# Patient Record
Sex: MALE | Race: WHITE | NOT HISPANIC OR LATINO | Employment: OTHER | URBAN - NONMETROPOLITAN AREA
[De-identification: names, ages, dates, MRNs, and addresses within clinical notes are randomized per-mention and may not be internally consistent; named-entity substitution may affect disease eponyms.]

---

## 2023-06-15 RX ORDER — ATORVASTATIN CALCIUM 40 MG/1
1 TABLET, FILM COATED ORAL DAILY
COMMUNITY
Start: 2017-12-14 | End: 2024-01-18 | Stop reason: SDUPTHER

## 2023-06-15 RX ORDER — OMEGA-3 FATTY ACIDS/FISH OIL 340-1000MG
1 CAPSULE ORAL DAILY
COMMUNITY

## 2023-06-15 RX ORDER — MULTIVIT WITH MINERALS/HERBS
1 TABLET ORAL DAILY
COMMUNITY

## 2023-06-15 RX ORDER — ASPIRIN 81 MG/1
1 TABLET ORAL DAILY
COMMUNITY

## 2023-06-15 RX ORDER — LISINOPRIL 30 MG/1
1 TABLET ORAL DAILY
COMMUNITY
End: 2023-06-16 | Stop reason: SDUPTHER

## 2023-06-15 RX ORDER — AMLODIPINE BESYLATE 5 MG/1
1 TABLET ORAL DAILY
COMMUNITY
Start: 2020-01-16 | End: 2024-01-18 | Stop reason: SDUPTHER

## 2023-06-16 ENCOUNTER — OFFICE VISIT (OUTPATIENT)
Dept: PRIMARY CARE | Facility: CLINIC | Age: 73
End: 2023-06-16
Payer: MEDICARE

## 2023-06-16 VITALS
HEART RATE: 72 BPM | SYSTOLIC BLOOD PRESSURE: 124 MMHG | WEIGHT: 186 LBS | HEIGHT: 70 IN | BODY MASS INDEX: 26.63 KG/M2 | DIASTOLIC BLOOD PRESSURE: 80 MMHG

## 2023-06-16 DIAGNOSIS — E78.2 MIXED HYPERLIPIDEMIA: ICD-10-CM

## 2023-06-16 DIAGNOSIS — I10 HYPERTENSION, UNSPECIFIED TYPE: ICD-10-CM

## 2023-06-16 DIAGNOSIS — I73.9 PAD (PERIPHERAL ARTERY DISEASE) (CMS-HCC): ICD-10-CM

## 2023-06-16 DIAGNOSIS — Z98.1 HISTORY OF LUMBAR FUSION: ICD-10-CM

## 2023-06-16 DIAGNOSIS — Z00.00 ROUTINE GENERAL MEDICAL EXAMINATION AT HEALTH CARE FACILITY: Primary | ICD-10-CM

## 2023-06-16 DIAGNOSIS — Z12.5 SCREENING FOR PROSTATE CANCER: ICD-10-CM

## 2023-06-16 PROCEDURE — 3079F DIAST BP 80-89 MM HG: CPT

## 2023-06-16 PROCEDURE — 1036F TOBACCO NON-USER: CPT

## 2023-06-16 PROCEDURE — 3074F SYST BP LT 130 MM HG: CPT

## 2023-06-16 PROCEDURE — G0439 PPPS, SUBSEQ VISIT: HCPCS

## 2023-06-16 PROCEDURE — 1170F FXNL STATUS ASSESSED: CPT

## 2023-06-16 PROCEDURE — 1159F MED LIST DOCD IN RCRD: CPT

## 2023-06-16 PROCEDURE — 99214 OFFICE O/P EST MOD 30 MIN: CPT

## 2023-06-16 PROCEDURE — 3008F BODY MASS INDEX DOCD: CPT

## 2023-06-16 RX ORDER — LISINOPRIL 30 MG/1
30 TABLET ORAL DAILY
Qty: 90 TABLET | Refills: 3 | Status: SHIPPED | OUTPATIENT
Start: 2023-06-16 | End: 2024-06-15

## 2023-06-16 ASSESSMENT — PATIENT HEALTH QUESTIONNAIRE - PHQ9
1. LITTLE INTEREST OR PLEASURE IN DOING THINGS: NOT AT ALL
2. FEELING DOWN, DEPRESSED OR HOPELESS: NOT AT ALL
SUM OF ALL RESPONSES TO PHQ9 QUESTIONS 1 AND 2: 0

## 2023-06-16 ASSESSMENT — ACTIVITIES OF DAILY LIVING (ADL)
GROCERY_SHOPPING: INDEPENDENT
DRESSING: INDEPENDENT
BATHING: INDEPENDENT
TAKING_MEDICATION: INDEPENDENT
MANAGING_FINANCES: INDEPENDENT
DOING_HOUSEWORK: INDEPENDENT

## 2023-06-16 ASSESSMENT — ENCOUNTER SYMPTOMS
CARDIOVASCULAR NEGATIVE: 1
RESPIRATORY NEGATIVE: 1
GASTROINTESTINAL NEGATIVE: 1
CONSTITUTIONAL NEGATIVE: 1

## 2023-06-16 NOTE — PROGRESS NOTES
"Subjective   Reason for Visit: Dariusz Panchal is an 72 y.o. male here for a Medicare Wellness visit.     Past Medical, Surgical, and Family History reviewed and updated in chart.    Reviewed all medications by prescribing practitioner or clinical pharmacist (such as prescriptions, OTCs, herbal therapies and supplements) and documented in the medical record.    HPI  HTN: BP good in office today 124/80. Endorses <130/80. Compliant with medication, no SE. Denies HA/CP/dizziness. Home BP is usually <130/80. Watches his salt intake.  HYPERLIPIDEMIA: Lipids good 6/20/22. Compliant with statin, no SE.   LUMBAR RADICULOPATHY: S/P L4-5 MIS TLIF January 2023 per Dr. De Jesus. Currently doing HEP. Feeling much better.   PAD: History of PAD, denies claudication recently. Has not seen vascular in a couple years. Does not want referral at this time.    Endorses BL knee/shoulder joint paint intermittently, would not like tx for this at this time.    Denies colon cancer screening.    Patient Care Team:  [place] PA-C as PCP - General     Review of Systems   Constitutional: Negative.    Respiratory: Negative.     Cardiovascular: Negative.    Gastrointestinal: Negative.        Objective   Vitals:  /80   Pulse 72   Ht 1.765 m (5' 9.5\")   Wt 84.4 kg (186 lb)   BMI 27.07 kg/m²       Physical Exam  Constitutional:       General: He is not in acute distress.     Appearance: Normal appearance. He is not ill-appearing or toxic-appearing.   HENT:      Head: Normocephalic and atraumatic.   Eyes:      Extraocular Movements: Extraocular movements intact.      Conjunctiva/sclera: Conjunctivae normal.   Cardiovascular:      Rate and Rhythm: Normal rate and regular rhythm.      Heart sounds: Normal heart sounds. No murmur heard.     No gallop.   Pulmonary:      Effort: Pulmonary effort is normal.      Breath sounds: Normal breath sounds. No stridor. No wheezing.   Abdominal:      General: Bowel sounds are normal. There is no distension. "      Palpations: Abdomen is soft.      Tenderness: There is no abdominal tenderness. There is no right CVA tenderness, left CVA tenderness, guarding or rebound.   Musculoskeletal:         General: Normal range of motion.      Cervical back: Neck supple.   Skin:     General: Skin is warm and dry.      Findings: No erythema or rash.   Neurological:      General: No focal deficit present.      Mental Status: He is alert and oriented to person, place, and time.   Psychiatric:         Mood and Affect: Mood normal.         Behavior: Behavior normal.         Thought Content: Thought content normal.         Judgment: Judgment normal.         Assessment/Plan   Problem List Items Addressed This Visit    None  Visit Diagnoses       Hypertension, unspecified type                 No medication changes today.  Chronic conditions stable.  We will follow up in 1 year with fasting labs prior.

## 2023-06-16 NOTE — PROGRESS NOTES
Subjective   Patient ID: Dariusz Panchal is a 72 y.o. male who presents for No chief complaint on file..    HPI   HTN: BP good in office today 128/80. Compliant with medication, no SE. Denies HA/CP/dizziness. Home BP is usually <130/80. Watches his salt intake.  HYPERLIPIDEMIA: Lipids good 6/20/22. Compliant with statin, no SE.   LUMBAR RADICULOPATHY: S/P L4-5 MIS TLIF January 2023 per Dr. De Jesus.   PAD: History of PAD, denies claudication recently. Has not seen vascular in a couple years. Does not want referral at this time.  URI: 3 days of coughing productive, sneezing, no fever, sore throat, no nasal congestion, no SOB.      Endorses cramps in his fingers and toes since back pain has become severe. Feels like they get stuck, spread out, until he can straighten them out. Not very painful, but concerning. Dr. De Jesus endorses this is most likely from his back issues.     Review of Systems    Objective   There were no vitals taken for this visit.    Physical Exam    Assessment/Plan

## 2023-07-10 ENCOUNTER — TELEPHONE (OUTPATIENT)
Dept: PRIMARY CARE | Facility: CLINIC | Age: 73
End: 2023-07-10

## 2023-07-10 ENCOUNTER — OFFICE VISIT (OUTPATIENT)
Dept: PRIMARY CARE | Facility: CLINIC | Age: 73
End: 2023-07-10
Payer: MEDICARE

## 2023-07-10 ENCOUNTER — LAB (OUTPATIENT)
Dept: LAB | Facility: LAB | Age: 73
End: 2023-07-10
Payer: MEDICARE

## 2023-07-10 VITALS
BODY MASS INDEX: 27.47 KG/M2 | SYSTOLIC BLOOD PRESSURE: 130 MMHG | HEART RATE: 77 BPM | OXYGEN SATURATION: 96 % | WEIGHT: 188.7 LBS | DIASTOLIC BLOOD PRESSURE: 80 MMHG

## 2023-07-10 DIAGNOSIS — I10 HYPERTENSION, UNSPECIFIED TYPE: ICD-10-CM

## 2023-07-10 DIAGNOSIS — R31.9 HEMATURIA, UNSPECIFIED TYPE: ICD-10-CM

## 2023-07-10 DIAGNOSIS — Z12.5 SCREENING FOR PROSTATE CANCER: ICD-10-CM

## 2023-07-10 DIAGNOSIS — R31.9 HEMATURIA, UNSPECIFIED TYPE: Primary | ICD-10-CM

## 2023-07-10 DIAGNOSIS — E78.2 MIXED HYPERLIPIDEMIA: ICD-10-CM

## 2023-07-10 LAB
ALANINE AMINOTRANSFERASE (SGPT) (U/L) IN SER/PLAS: 16 U/L (ref 10–52)
ALBUMIN (G/DL) IN SER/PLAS: 4.3 G/DL (ref 3.4–5)
ALKALINE PHOSPHATASE (U/L) IN SER/PLAS: 59 U/L (ref 33–136)
ANION GAP IN SER/PLAS: 8 MMOL/L (ref 10–20)
ASPARTATE AMINOTRANSFERASE (SGOT) (U/L) IN SER/PLAS: 19 U/L (ref 9–39)
BASOPHILS (10*3/UL) IN BLOOD BY AUTOMATED COUNT: 0.04 X10E9/L (ref 0–0.1)
BASOPHILS/100 LEUKOCYTES IN BLOOD BY AUTOMATED COUNT: 0.6 % (ref 0–2)
BILIRUBIN TOTAL (MG/DL) IN SER/PLAS: 0.4 MG/DL (ref 0–1.2)
CALCIUM (MG/DL) IN SER/PLAS: 9.5 MG/DL (ref 8.6–10.3)
CARBON DIOXIDE, TOTAL (MMOL/L) IN SER/PLAS: 28 MMOL/L (ref 21–32)
CHLORIDE (MMOL/L) IN SER/PLAS: 108 MMOL/L (ref 98–107)
CHOLESTEROL (MG/DL) IN SER/PLAS: 126 MG/DL (ref 0–199)
CHOLESTEROL IN HDL (MG/DL) IN SER/PLAS: 50 MG/DL
CHOLESTEROL/HDL RATIO: 2.5
CREATININE (MG/DL) IN SER/PLAS: 1.01 MG/DL (ref 0.5–1.3)
EOSINOPHILS (10*3/UL) IN BLOOD BY AUTOMATED COUNT: 0.19 X10E9/L (ref 0–0.4)
EOSINOPHILS/100 LEUKOCYTES IN BLOOD BY AUTOMATED COUNT: 3 % (ref 0–6)
ERYTHROCYTE DISTRIBUTION WIDTH (RATIO) BY AUTOMATED COUNT: 14.1 % (ref 11.5–14.5)
ERYTHROCYTE MEAN CORPUSCULAR HEMOGLOBIN CONCENTRATION (G/DL) BY AUTOMATED: 31.5 G/DL (ref 32–36)
ERYTHROCYTE MEAN CORPUSCULAR VOLUME (FL) BY AUTOMATED COUNT: 95 FL (ref 80–100)
ERYTHROCYTES (10*6/UL) IN BLOOD BY AUTOMATED COUNT: 4.3 X10E12/L (ref 4.5–5.9)
GFR MALE: 79 ML/MIN/1.73M2
GLUCOSE (MG/DL) IN SER/PLAS: 96 MG/DL (ref 74–99)
HEMATOCRIT (%) IN BLOOD BY AUTOMATED COUNT: 40.9 % (ref 41–52)
HEMOGLOBIN (G/DL) IN BLOOD: 12.9 G/DL (ref 13.5–17.5)
IMMATURE GRANULOCYTES/100 LEUKOCYTES IN BLOOD BY AUTOMATED COUNT: 0.3 % (ref 0–0.9)
INR IN PPP BY COAGULATION ASSAY: 1 (ref 0.9–1.1)
LDL: 61 MG/DL (ref 0–99)
LEUKOCYTES (10*3/UL) IN BLOOD BY AUTOMATED COUNT: 6.4 X10E9/L (ref 4.4–11.3)
LYMPHOCYTES (10*3/UL) IN BLOOD BY AUTOMATED COUNT: 1.76 X10E9/L (ref 0.8–3)
LYMPHOCYTES/100 LEUKOCYTES IN BLOOD BY AUTOMATED COUNT: 27.7 % (ref 13–44)
MONOCYTES (10*3/UL) IN BLOOD BY AUTOMATED COUNT: 0.43 X10E9/L (ref 0.05–0.8)
MONOCYTES/100 LEUKOCYTES IN BLOOD BY AUTOMATED COUNT: 6.8 % (ref 2–10)
NEUTROPHILS (10*3/UL) IN BLOOD BY AUTOMATED COUNT: 3.92 X10E9/L (ref 1.6–5.5)
NEUTROPHILS/100 LEUKOCYTES IN BLOOD BY AUTOMATED COUNT: 61.6 % (ref 40–80)
PLATELETS (10*3/UL) IN BLOOD AUTOMATED COUNT: 247 X10E9/L (ref 150–450)
POC APPEARANCE, URINE: CLEAR
POC BILIRUBIN, URINE: NEGATIVE
POC BLOOD, URINE: ABNORMAL
POC COLOR, URINE: ABNORMAL
POC GLUCOSE, URINE: NEGATIVE MG/DL
POC KETONES, URINE: NEGATIVE MG/DL
POC LEUKOCYTES, URINE: NEGATIVE
POC NITRITE,URINE: NEGATIVE
POC PH, URINE: 7 PH
POC PROTEIN, URINE: ABNORMAL MG/DL
POC SPECIFIC GRAVITY, URINE: 1.02
POC UROBILINOGEN, URINE: 0.2 EU/DL
POTASSIUM (MMOL/L) IN SER/PLAS: 4.3 MMOL/L (ref 3.5–5.3)
PROSTATE SPECIFIC ANTIGEN,SCREEN: 3.2 NG/ML (ref 0–4)
PROTEIN TOTAL: 6.6 G/DL (ref 6.4–8.2)
PROTHROMBIN TIME (PT) IN PPP BY COAGULATION ASSAY: 11.4 SEC (ref 9.8–12.8)
SODIUM (MMOL/L) IN SER/PLAS: 140 MMOL/L (ref 136–145)
TRIGLYCERIDE (MG/DL) IN SER/PLAS: 74 MG/DL (ref 0–149)
UREA NITROGEN (MG/DL) IN SER/PLAS: 18 MG/DL (ref 6–23)
VLDL: 15 MG/DL (ref 0–40)

## 2023-07-10 PROCEDURE — 88112 CYTOPATH CELL ENHANCE TECH: CPT | Performed by: PATHOLOGY

## 2023-07-10 PROCEDURE — 85610 PROTHROMBIN TIME: CPT

## 2023-07-10 PROCEDURE — 80053 COMPREHEN METABOLIC PANEL: CPT

## 2023-07-10 PROCEDURE — 1036F TOBACCO NON-USER: CPT | Performed by: STUDENT IN AN ORGANIZED HEALTH CARE EDUCATION/TRAINING PROGRAM

## 2023-07-10 PROCEDURE — 80061 LIPID PANEL: CPT

## 2023-07-10 PROCEDURE — 36415 COLL VENOUS BLD VENIPUNCTURE: CPT

## 2023-07-10 PROCEDURE — 88112 CYTOPATH CELL ENHANCE TECH: CPT

## 2023-07-10 PROCEDURE — 1159F MED LIST DOCD IN RCRD: CPT | Performed by: STUDENT IN AN ORGANIZED HEALTH CARE EDUCATION/TRAINING PROGRAM

## 2023-07-10 PROCEDURE — 3008F BODY MASS INDEX DOCD: CPT | Performed by: STUDENT IN AN ORGANIZED HEALTH CARE EDUCATION/TRAINING PROGRAM

## 2023-07-10 PROCEDURE — 87086 URINE CULTURE/COLONY COUNT: CPT

## 2023-07-10 PROCEDURE — 85025 COMPLETE CBC W/AUTO DIFF WBC: CPT

## 2023-07-10 PROCEDURE — G0103 PSA SCREENING: HCPCS

## 2023-07-10 PROCEDURE — 81003 URINALYSIS AUTO W/O SCOPE: CPT | Performed by: STUDENT IN AN ORGANIZED HEALTH CARE EDUCATION/TRAINING PROGRAM

## 2023-07-10 PROCEDURE — 99213 OFFICE O/P EST LOW 20 MIN: CPT | Performed by: STUDENT IN AN ORGANIZED HEALTH CARE EDUCATION/TRAINING PROGRAM

## 2023-07-10 PROCEDURE — 3075F SYST BP GE 130 - 139MM HG: CPT | Performed by: STUDENT IN AN ORGANIZED HEALTH CARE EDUCATION/TRAINING PROGRAM

## 2023-07-10 PROCEDURE — 3079F DIAST BP 80-89 MM HG: CPT | Performed by: STUDENT IN AN ORGANIZED HEALTH CARE EDUCATION/TRAINING PROGRAM

## 2023-07-10 NOTE — TELEPHONE ENCOUNTER
----- Message from [place] PA-C sent at 7/10/2023 12:46 PM EDT -----  Please let him know his labs look fine. Thanks!

## 2023-07-10 NOTE — PROGRESS NOTES
Subjective   Patient ID: Dariusz Panchal is a 72 y.o. male who presents for Sick (Blood in Urine. Noticed the blood the first time a couple months ago and then a week later. This time it started Saturday. Still has hematuria. Denies dysuria. Denies HX of kidney stones. Denies foul smell. Twice he has noticed clots. The rest of the time it has been pink in color. Denies prostate issues ).    HPI    Blood in urine since the last two days. No recent injuries. No change in medication, supplements or diet. No hx of renal calculi. Slight ache in the left flank area. But not significant pain. Takes aspirin 81 mg.  Hx of smoking - smokes for 50 years. Quit 5 years ago.   Urinalysis concerning for both bleeding and proteinuria. Discussed the differentials with patient.   Referral to urology for further evaluation.   Recommended to seek immediate medical attention if acute worsening of symptoms.    Review of Systems  ROS negative except discussed above in HPI.    Vitals:    07/10/23 0944   BP: 130/80   Pulse: 77   SpO2: 96%     Objective   Physical Exam  Constitutional:       Appearance: Normal appearance.   Abdominal:      Comments: Mild tenderness in the flank area    Neurological:      Mental Status: He is alert.       Assessment/Plan   Dariusz was seen today for sick.  Diagnoses and all orders for this visit:  Hematuria, unspecified type (Primary)  -     Referral to Urology; Future  -     Urinalysis with Reflex Microscopic; Future  -     Urine Culture; Future  -     Protime-INR; Future  -     CBC and Auto Differential; Future  -     POCT UA Automated manually resulted  -     XR abdomen 1 view; Future  -     Urine Culture  -     Urinalysis with Reflex Microscopic  -     Cytology, non-gynecologic; Future  -     Cytology, non-gynecologic      Follow up as needed.      Clement Del Rio MD MPH    CT scan results communicated with wife and patient. They expressed understanding. They will reach out to his urologist office  "tomorrow.     \"IMPRESSION:  1.  A 2.1 cm mass arising from the right posterior bladder wall,  concerning for urinary bladder neoplasm. Cystoscopy and biopsy are  recommended.  2. Possible involvement of the right UVJ, however there is no right  hydroureteronephrosis or CT evidence of extramural extension.  3. No evidence of abdominopelvic lymphadenopathy.  4. 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

## 2023-07-11 LAB — URINE CULTURE: NORMAL

## 2023-07-14 LAB
COMPLETE PATHOLOGY REPORT: NORMAL
CONVERTED CLINICAL DIAGNOSIS-HISTORY: NORMAL
CONVERTED DIAGNOSIS COMMENT: NORMAL
CONVERTED FINAL DIAGNOSIS: NORMAL
CONVERTED FINAL REPORT PDF LINK TO COPY AND PASTE: NORMAL
CONVERTED SPECIMEN DESCRIPTION: NORMAL

## 2023-07-17 LAB — TESTOSTERONE (NG/DL) IN SER/PLAS: 926 NG/DL (ref 240–1000)

## 2023-08-08 ENCOUNTER — HOSPITAL ENCOUNTER (OUTPATIENT)
Dept: DATA CONVERSION | Facility: HOSPITAL | Age: 73
End: 2023-08-08
Attending: UROLOGY | Admitting: UROLOGY
Payer: MEDICARE

## 2023-08-08 DIAGNOSIS — N20.1 CALCULUS OF URETER: ICD-10-CM

## 2023-08-08 DIAGNOSIS — D49.4 NEOPLASM OF UNSPECIFIED BEHAVIOR OF BLADDER: ICD-10-CM

## 2023-08-08 DIAGNOSIS — Z87.891 PERSONAL HISTORY OF NICOTINE DEPENDENCE: ICD-10-CM

## 2023-08-08 DIAGNOSIS — C67.9 MALIGNANT NEOPLASM OF BLADDER, UNSPECIFIED (MULTI): ICD-10-CM

## 2023-08-11 LAB
COMPLETE PATHOLOGY REPORT: NORMAL
CONVERTED CLINICAL DIAGNOSIS-HISTORY: NORMAL
CONVERTED FINAL DIAGNOSIS: NORMAL
CONVERTED FINAL REPORT PDF LINK TO COPY AND PASTE: NORMAL
CONVERTED GROSS DESCRIPTION: NORMAL
CONVERTED PHYSICIAN NOTIFICATION: NORMAL

## 2023-09-29 PROBLEM — I71.43 ANEURYSM OF INFRARENAL ABDOMINAL AORTA (CMS-HCC): Status: ACTIVE | Noted: 2023-09-29

## 2023-09-29 PROBLEM — M48.062 NEUROGENIC CLAUDICATION DUE TO LUMBAR SPINAL STENOSIS: Status: ACTIVE | Noted: 2023-09-29

## 2023-09-29 PROBLEM — N52.9 ERECTILE DYSFUNCTION: Status: ACTIVE | Noted: 2023-09-29

## 2023-09-29 PROBLEM — M79.661 RIGHT CALF PAIN: Status: ACTIVE | Noted: 2023-09-29

## 2023-09-29 PROBLEM — G24.9 DYSTONIA: Status: ACTIVE | Noted: 2023-09-29

## 2023-09-29 PROBLEM — C67.9 BLADDER CANCER (MULTI): Status: ACTIVE | Noted: 2023-09-29

## 2023-09-29 PROBLEM — D49.4 BLADDER TUMOR: Status: ACTIVE | Noted: 2023-09-29

## 2023-09-29 PROBLEM — M47.817 LUMBOSACRAL SPONDYLOSIS: Status: ACTIVE | Noted: 2023-09-29

## 2023-09-29 PROBLEM — R35.1 NOCTURIA: Status: ACTIVE | Noted: 2023-09-29

## 2023-09-29 PROBLEM — M54.17 LUMBOSACRAL RADICULITIS: Status: ACTIVE | Noted: 2023-09-29

## 2023-09-29 PROBLEM — E66.3 OVERWEIGHT WITH BODY MASS INDEX (BMI) OF 26 TO 26.9 IN ADULT: Status: ACTIVE | Noted: 2023-09-29

## 2023-09-29 PROBLEM — R31.9 HEMATURIA: Status: ACTIVE | Noted: 2023-09-29

## 2023-09-29 PROBLEM — M51.26 PROLAPSED LUMBAR DISC: Status: ACTIVE | Noted: 2023-09-29

## 2023-09-29 PROBLEM — H93.11 TINNITUS OF RIGHT EAR: Status: ACTIVE | Noted: 2023-09-29

## 2023-09-29 PROBLEM — G61.0: Status: ACTIVE | Noted: 2023-09-29

## 2023-09-29 PROBLEM — R53.83 FATIGUE: Status: ACTIVE | Noted: 2023-09-29

## 2023-09-29 PROBLEM — I73.9 CLAUDICATION (CMS-HCC): Status: ACTIVE | Noted: 2023-09-29

## 2023-09-29 PROBLEM — E78.5 DYSLIPIDEMIA: Status: ACTIVE | Noted: 2023-09-29

## 2023-09-29 PROBLEM — E66.3 OVERWEIGHT WITH BODY MASS INDEX (BMI) OF 27 TO 27.9 IN ADULT: Status: ACTIVE | Noted: 2023-09-29

## 2023-09-29 RX ORDER — PREDNISONE 20 MG/1
20 TABLET ORAL DAILY
COMMUNITY

## 2023-09-30 NOTE — H&P
History & Physical Reviewed:   I have reviewed the History and Physical dated:  02-Aug-2023   History and Physical reviewed and relevant findings noted. Patient examined to review pertinent physical  findings.: No significant changes   Home Medications Reviewed: no changes noted   Allergies Reviewed: no changes noted       ERAS (Enhanced Recovery After Surgery):  ·  ERAS Patient: no     Consent:   COVID-19 Consent:  ·  COVID-19 Risk Consent Surgeon has reviewed key risks related to the risk of felicia COVID-19 and if they contract COVID-19 what the risks are.       Electronic Signatures:  Fab Sotelo)  (Signed 08-Aug-2023 08:12)   Authored: History & Physical Reviewed, ERAS, Consent,  Note Completion      Last Updated: 08-Aug-2023 08:12 by Fab Sotelo)

## 2023-10-01 NOTE — OP NOTE
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 CYSTO TURBT W MITOMYCIN, R RPG R URETEROSCOPY R STENT      Anesthesia: No anesthesiologist associated with this case  Estimated Blood Loss: 0  Findings: see op note  Specimens(s) Collected: yes,           Operative Report:   Preoperative diagnosis: Bladder mass  Postop diagnosis: Same  Procedure: Cystoscopy TURBT with instillation of mitomycin, cysto Right RPG Right ureteroscopy right stent placement  Physician: Ashleigh  Anesthesia: Gen.  Estimated blood loss: Minimal    Indications and consent: The patient presents for treatment of a 3cm bladder mass seen on recent office cystoscopy. After the risks, benefits, alternatives, indications were explained they consented.    Procedure: The patient was brought to the operating room and placed on the table in the supine position area after adequate anesthesia was obtained the patient was prepped and draped in the standard surgical fashion. First the resectoscope was inserted  into the bladder using the visual obturator. The previously seen tumor was identified. This was very close to the right ureteral opening. Indigo Newton was given to identify it. An RPG and ureteroscopy were performed and confirmed no ureteral tumors  were present. A 5 x 24 stent was then placed using Flouro. We then resected the tumor in a systematic fashion starting laterally and working medially.  Once all tumor had been resected  the tumor pieces were removed using the [place] evacuator. Electrocautery  was used to obtain hemostasis. A three-way North catheter was then inserted and irrigated. Clear to light pink urine drained. Mitomycin was instilled and the catheter was plugged. The patient tolerated  the procedure well there were no complications    follow up 2 weeks for results                        Attestation:   Note Completion:  Attending Attestation I performed the procedure without a resident         Electronic Signatures:  Fab Sotelo)  (Signed 08-Aug-2023 14:11)   Authored: Post-Operative Note, Chart Review, Note Completion      Last Updated: 08-Aug-2023 14:11 by Fab Sotelo)

## 2023-10-02 ENCOUNTER — OFFICE VISIT (OUTPATIENT)
Dept: UROLOGY | Facility: CLINIC | Age: 73
End: 2023-10-02
Payer: MEDICARE

## 2023-10-02 DIAGNOSIS — C67.9 MALIGNANT NEOPLASM OF URINARY BLADDER, UNSPECIFIED SITE (MULTI): Primary | ICD-10-CM

## 2023-10-02 PROCEDURE — 1159F MED LIST DOCD IN RCRD: CPT | Performed by: UROLOGY

## 2023-10-02 PROCEDURE — 1036F TOBACCO NON-USER: CPT | Performed by: UROLOGY

## 2023-10-02 PROCEDURE — 51720 TREATMENT OF BLADDER LESION: CPT | Performed by: UROLOGY

## 2023-10-02 PROCEDURE — 3008F BODY MASS INDEX DOCD: CPT | Performed by: UROLOGY

## 2023-10-02 NOTE — PROGRESS NOTES
Patient ID: Dariusz Panchal is a 73 y.o. male.    Procedures  The patient was prepped and draped in the standard fashion.  Lidociane jelly was used on the urethral   Meatus.  A 16 Icelandic red rubber catheter was placed into the bladder.  The bladder was drained.  The bladder was  Then irrigated several times until clear without mucous.  Aprroximately 60ml of a saline and ___BCG_____  Solutions was left in the bladder.  The catheter was removed and patient  was asked to hold the solution in the  Bladder as long as possible

## 2023-10-11 ENCOUNTER — PROCEDURE VISIT (OUTPATIENT)
Dept: UROLOGY | Facility: CLINIC | Age: 73
End: 2023-10-11
Payer: MEDICARE

## 2023-10-11 DIAGNOSIS — C67.9 MALIGNANT NEOPLASM OF URINARY BLADDER, UNSPECIFIED SITE (MULTI): Primary | ICD-10-CM

## 2023-10-11 PROCEDURE — 51720 TREATMENT OF BLADDER LESION: CPT | Performed by: UROLOGY

## 2023-10-11 NOTE — PROGRESS NOTES
The patient was prepped and draped in the standard fashion.  Lidociane jelly was used on the urethral   Meatus.  A 16 Bhutanese red rubber catheter was placed into the bladder.  The bladder was drained.  The bladder was  Then irrigated several times until clear without mucous.  Aprroximately 60ml of a saline and _______BCG __________  Solutions was left in the bladder.  The catheter was removed and patient  was asked to hold the solution in the  Bladder as long as possible  BCG 5/6 given  Follow up BCG in 1 week

## 2023-10-18 ENCOUNTER — PROCEDURE VISIT (OUTPATIENT)
Dept: UROLOGY | Facility: CLINIC | Age: 73
End: 2023-10-18
Payer: MEDICARE

## 2023-10-18 DIAGNOSIS — C67.9 MALIGNANT NEOPLASM OF URINARY BLADDER, UNSPECIFIED SITE (MULTI): Primary | ICD-10-CM

## 2023-10-18 PROCEDURE — 51720 TREATMENT OF BLADDER LESION: CPT | Performed by: UROLOGY

## 2023-10-18 NOTE — PROGRESS NOTES
Patient ID: Dariusz Panchal is a 73 y.o. male.    Procedures  The patient was prepped and draped in the standard fashion.  Lidociane jelly was used on the urethral   Meatus.  A 16 Swedish red rubber catheter was placed into the bladder.  The bladder was drained.  The bladder was  Then irrigated several times until clear without mucous.  Aprroximately 60ml of a saline and __BCG______  Solutions was left in the bladder.  The catheter was removed and patient  was asked to hold the solution in the  Bladder as long as possible     Follow up Dec Cystoscopy

## 2023-12-03 DIAGNOSIS — E78.2 MIXED HYPERLIPIDEMIA: ICD-10-CM

## 2023-12-04 RX ORDER — ATORVASTATIN CALCIUM 40 MG/1
40 TABLET, FILM COATED ORAL DAILY
Qty: 90 TABLET | Refills: 3 | OUTPATIENT
Start: 2023-12-04

## 2023-12-05 NOTE — PROGRESS NOTES
Patient ID: Dariusz Panchal is a 73 y.o. male.    Procedures   The patient was prepped using a Betadine solution. Lidocaine jelly was instilled into the urethra. The flexible cystoscope was sterilely inserted into the urethra and formal cystoscopy performed in a systematic fashion. . For detailed findings of the procedure, please see Dr. Sotelo remarks below  CIPRO 250MG POBID TIMES 3 DAYS GIVEN    BLADDER CANCER-HIGH GRADE (/8/8/2023)  PATIENT  HAS HAD 6 BCG TREATMENTS      PSA 3.20 (7/10/2023)   2.40 96/23/2021)    NO RECURRENT TUMOR. NORMAL CYSTO    BCG X 3 THEN REPEAT CYSTO 3 MONTHS(3/24)    CT UROGRAM ORDERED FOR 3/24

## 2023-12-13 ENCOUNTER — PROCEDURE VISIT (OUTPATIENT)
Dept: UROLOGY | Facility: CLINIC | Age: 73
End: 2023-12-13
Payer: MEDICARE

## 2023-12-13 VITALS
DIASTOLIC BLOOD PRESSURE: 70 MMHG | BODY MASS INDEX: 27.99 KG/M2 | SYSTOLIC BLOOD PRESSURE: 132 MMHG | WEIGHT: 189 LBS | RESPIRATION RATE: 16 BRPM | HEIGHT: 69 IN

## 2023-12-13 DIAGNOSIS — C67.9 MALIGNANT NEOPLASM OF URINARY BLADDER, UNSPECIFIED SITE (MULTI): Primary | ICD-10-CM

## 2023-12-13 PROCEDURE — 52000 CYSTOURETHROSCOPY: CPT | Performed by: UROLOGY

## 2023-12-13 RX ORDER — CIPROFLOXACIN 250 MG/1
250 TABLET, FILM COATED ORAL 2 TIMES DAILY
Qty: 6 TABLET | Refills: 0 | Status: SHIPPED | OUTPATIENT
Start: 2023-12-13 | End: 2023-12-16

## 2023-12-19 NOTE — PROGRESS NOTES
Patient ID: Dariusz Panchal is a 73 y.o. male.    Procedures    The patient was prepped and draped in the standard fashion.  Lidociane jelly was used on the urethral   Meatus.  A 16 Burundian red rubber catheter was placed into the bladder.  The bladder was drained.  The bladder was  Then irrigated several times until clear without mucous.  Aprroximately 60ml of a saline and ________bcg_________  Solutions was left in the bladder.  The catheter was removed and patient  was asked to hold the solution in the  Bladder as long as possible  BCG 1/3 GIVEN  FOLLOW UP BCG IN 1 WEEK

## 2024-01-03 ENCOUNTER — PROCEDURE VISIT (OUTPATIENT)
Dept: UROLOGY | Facility: CLINIC | Age: 74
End: 2024-01-03
Payer: MEDICARE

## 2024-01-03 DIAGNOSIS — C67.9 MALIGNANT NEOPLASM OF URINARY BLADDER, UNSPECIFIED SITE (MULTI): ICD-10-CM

## 2024-01-03 PROCEDURE — 51720 TREATMENT OF BLADDER LESION: CPT | Performed by: UROLOGY

## 2024-01-16 NOTE — PROGRESS NOTES
Patient ID: Dariusz Panchal is a 73 y.o. male.    Procedures  The patient was prepped and draped in the standard fashion.  Lidociane jelly was used on the urethral   Meatus.  A 16 Indonesian red rubber catheter was placed into the bladder.  The bladder was drained.  The bladder was  Then irrigated several times until clear without mucous.  Aprroximately 60ml of a saline and _____BCG____________  Solutions was left in the bladder.  The catheter was removed and patient  was asked to hold the solution in the  Bladder as long as possible     BCG 2/3 GIVEN    FOLLOW UP BCG IN 1 WEEK.

## 2024-01-17 ENCOUNTER — PROCEDURE VISIT (OUTPATIENT)
Dept: UROLOGY | Facility: CLINIC | Age: 74
End: 2024-01-17
Payer: MEDICARE

## 2024-01-17 DIAGNOSIS — C67.9 MALIGNANT NEOPLASM OF URINARY BLADDER, UNSPECIFIED SITE (MULTI): ICD-10-CM

## 2024-01-17 PROCEDURE — 51720 TREATMENT OF BLADDER LESION: CPT | Performed by: UROLOGY

## 2024-01-18 ENCOUNTER — TELEPHONE (OUTPATIENT)
Dept: PRIMARY CARE | Facility: CLINIC | Age: 74
End: 2024-01-18
Payer: MEDICARE

## 2024-01-18 DIAGNOSIS — I10 HYPERTENSION, UNSPECIFIED TYPE: ICD-10-CM

## 2024-01-18 DIAGNOSIS — E78.2 MIXED HYPERLIPIDEMIA: ICD-10-CM

## 2024-01-18 RX ORDER — AMLODIPINE BESYLATE 5 MG/1
5 TABLET ORAL DAILY
Qty: 90 TABLET | Refills: 3 | Status: SHIPPED | OUTPATIENT
Start: 2024-01-18 | End: 2025-01-17

## 2024-01-18 RX ORDER — ATORVASTATIN CALCIUM 40 MG/1
40 TABLET, FILM COATED ORAL DAILY
Qty: 90 TABLET | Refills: 3 | Status: SHIPPED | OUTPATIENT
Start: 2024-01-18 | End: 2025-01-17

## 2024-01-23 NOTE — PROGRESS NOTES
Patient ID: Dariusz Panchal is a 73 y.o. male.    Procedures  The patient was prepped and draped in the standard fashion.  Lidociane jelly was used on the urethral   Meatus.  A 16 Kyrgyz red rubber catheter was placed into the bladder.  The bladder was drained.  The bladder was  Then irrigated several times until clear without mucous.  Aprroximately 60ml of a saline and ___BCG ______________  Solutions was left in the bladder.  The catheter was removed and patient  was asked to hold the solution in the  Bladder as long as possible   BCG 3/3 GIVEN  FOLLOW UP CYSTO IN 3 MONTHS.

## 2024-01-24 ENCOUNTER — CLINICAL SUPPORT (OUTPATIENT)
Dept: UROLOGY | Facility: CLINIC | Age: 74
End: 2024-01-24
Payer: MEDICARE

## 2024-01-24 DIAGNOSIS — C67.9 MALIGNANT NEOPLASM OF URINARY BLADDER, UNSPECIFIED SITE (MULTI): ICD-10-CM

## 2024-01-24 PROCEDURE — 51720 TREATMENT OF BLADDER LESION: CPT | Performed by: UROLOGY

## 2024-03-08 ENCOUNTER — LAB (OUTPATIENT)
Dept: LAB | Facility: LAB | Age: 74
End: 2024-03-08
Payer: MEDICARE

## 2024-03-08 DIAGNOSIS — C67.9 MALIGNANT NEOPLASM OF URINARY BLADDER, UNSPECIFIED SITE (MULTI): ICD-10-CM

## 2024-03-08 LAB
CREAT SERPL-MCNC: 1.09 MG/DL (ref 0.5–1.3)
EGFRCR SERPLBLD CKD-EPI 2021: 72 ML/MIN/1.73M*2

## 2024-03-08 PROCEDURE — 82565 ASSAY OF CREATININE: CPT

## 2024-03-08 PROCEDURE — 36415 COLL VENOUS BLD VENIPUNCTURE: CPT

## 2024-03-13 ENCOUNTER — APPOINTMENT (OUTPATIENT)
Dept: RADIOLOGY | Facility: CLINIC | Age: 74
End: 2024-03-13
Payer: MEDICARE

## 2024-03-13 ENCOUNTER — HOSPITAL ENCOUNTER (OUTPATIENT)
Dept: RADIOLOGY | Facility: HOSPITAL | Age: 74
Discharge: HOME | End: 2024-03-13
Payer: MEDICARE

## 2024-03-13 DIAGNOSIS — C67.9 MALIGNANT NEOPLASM OF URINARY BLADDER, UNSPECIFIED SITE (MULTI): ICD-10-CM

## 2024-03-13 PROCEDURE — 2550000001 HC RX 255 CONTRASTS: Performed by: UROLOGY

## 2024-03-13 PROCEDURE — 76377 3D RENDER W/INTRP POSTPROCES: CPT | Performed by: RADIOLOGY

## 2024-03-13 PROCEDURE — 74178 CT ABD&PLV WO CNTR FLWD CNTR: CPT | Performed by: RADIOLOGY

## 2024-03-13 PROCEDURE — 76377 3D RENDER W/INTRP POSTPROCES: CPT

## 2024-03-13 RX ADMIN — IOHEXOL 67 ML: 350 INJECTION, SOLUTION INTRAVENOUS at 14:53

## 2024-04-25 NOTE — PROGRESS NOTES
Patient ID: Dariusz Panchal is a 73 y.o. male.    Procedures  The patient was prepped using a Betadine solution. Lidocaine jelly was instilled into the urethra. The flexible cystoscope was sterilely inserted into the urethra and formal cystoscopy performed in a systematic fashion. . For detailed findings of the procedure, please see Dr. Sotelo remarks below  CIPRO 250MG POBID TIMED 3 DAYS GIVEN    PSA  3.20 (7/10/2023)  2.40 (6/23/2021)      BLADDER CANCER- HIGH GRADE 8/8/2023  PATIENT HAS HAD 9 BCG TREATMENTS    CT 3/14/2024  IMPRESSION:  1. No focal wall thickening or soft tissue lesions identified within  the bladder. No evidence of metastatic disease within the abdomen or  pelvis.  2. No nephroureterolithiasis or hydroureteronephrosis.  3. 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 No evidence of acute complications.      NO MASS. NO STONE. MILD TRABECULATION. NO MEDIAN LOBE. NORMAL CYSTO      F/U BCG X3

## 2024-05-01 ENCOUNTER — PROCEDURE VISIT (OUTPATIENT)
Dept: UROLOGY | Facility: CLINIC | Age: 74
End: 2024-05-01
Payer: MEDICARE

## 2024-05-01 VITALS — BODY MASS INDEX: 27.7 KG/M2 | WEIGHT: 187 LBS | HEIGHT: 69 IN

## 2024-05-01 DIAGNOSIS — C67.9 MALIGNANT NEOPLASM OF URINARY BLADDER, UNSPECIFIED SITE (MULTI): Primary | ICD-10-CM

## 2024-05-01 PROCEDURE — 52000 CYSTOURETHROSCOPY: CPT | Performed by: UROLOGY

## 2024-05-01 RX ORDER — CIPROFLOXACIN 250 MG/1
250 TABLET, FILM COATED ORAL 2 TIMES DAILY
Qty: 6 TABLET | Refills: 0 | Status: SHIPPED | OUTPATIENT
Start: 2024-05-01 | End: 2024-05-04

## 2024-05-21 NOTE — PROGRESS NOTES
Patient ID: Dariusz Panchal is a 73 y.o. male.    Procedures  The patient was prepped and draped in the standard fashion.  Lidociane jelly was used on the urethral   Meatus.  A 16 Romanian red rubber catheter was placed into the bladder.  The bladder was drained.  The bladder was  Then irrigated several times until clear without mucous.  Aprroximately 60ml of a saline and _____BCG____________  Solutions was left in the bladder.  The catheter was removed and patient  was asked to hold the solution in the  Bladder as long as possible      BCG 1/3 GIVEN  FOLLOW UP BCG IN 1 WEEK

## 2024-05-22 ENCOUNTER — APPOINTMENT (OUTPATIENT)
Dept: UROLOGY | Facility: CLINIC | Age: 74
End: 2024-05-22
Payer: MEDICARE

## 2024-06-05 ENCOUNTER — APPOINTMENT (OUTPATIENT)
Dept: UROLOGY | Facility: CLINIC | Age: 74
End: 2024-06-05
Payer: MEDICARE

## 2024-06-13 ENCOUNTER — LAB (OUTPATIENT)
Dept: LAB | Facility: LAB | Age: 74
End: 2024-06-13
Payer: MEDICARE

## 2024-06-13 DIAGNOSIS — Z12.5 SCREENING FOR PROSTATE CANCER: ICD-10-CM

## 2024-06-13 DIAGNOSIS — I10 HYPERTENSION, UNSPECIFIED TYPE: ICD-10-CM

## 2024-06-13 DIAGNOSIS — E78.2 MIXED HYPERLIPIDEMIA: Primary | ICD-10-CM

## 2024-06-13 DIAGNOSIS — E78.2 MIXED HYPERLIPIDEMIA: ICD-10-CM

## 2024-06-13 LAB
ALBUMIN SERPL BCP-MCNC: 4.3 G/DL (ref 3.4–5)
ALP SERPL-CCNC: 61 U/L (ref 33–136)
ALT SERPL W P-5'-P-CCNC: 20 U/L (ref 10–52)
ANION GAP SERPL CALC-SCNC: 10 MMOL/L (ref 10–20)
AST SERPL W P-5'-P-CCNC: 20 U/L (ref 9–39)
BILIRUB SERPL-MCNC: 0.7 MG/DL (ref 0–1.2)
BUN SERPL-MCNC: 12 MG/DL (ref 6–23)
CALCIUM SERPL-MCNC: 9.3 MG/DL (ref 8.6–10.3)
CHLORIDE SERPL-SCNC: 108 MMOL/L (ref 98–107)
CHOLEST SERPL-MCNC: 108 MG/DL (ref 0–199)
CHOLESTEROL/HDL RATIO: 2.4
CO2 SERPL-SCNC: 26 MMOL/L (ref 21–32)
CREAT SERPL-MCNC: 1.03 MG/DL (ref 0.5–1.3)
EGFRCR SERPLBLD CKD-EPI 2021: 77 ML/MIN/1.73M*2
ERYTHROCYTE [DISTWIDTH] IN BLOOD BY AUTOMATED COUNT: 13.1 % (ref 11.5–14.5)
GLUCOSE SERPL-MCNC: 84 MG/DL (ref 74–99)
HCT VFR BLD AUTO: 47.2 % (ref 41–52)
HDLC SERPL-MCNC: 45 MG/DL
HGB BLD-MCNC: 15.4 G/DL (ref 13.5–17.5)
LDLC SERPL CALC-MCNC: 40 MG/DL
MCH RBC QN AUTO: 32 PG (ref 26–34)
MCHC RBC AUTO-ENTMCNC: 32.6 G/DL (ref 32–36)
MCV RBC AUTO: 98 FL (ref 80–100)
NON HDL CHOLESTEROL: 63 MG/DL (ref 0–149)
NRBC BLD-RTO: 0 /100 WBCS (ref 0–0)
PLATELET # BLD AUTO: 217 X10*3/UL (ref 150–450)
POTASSIUM SERPL-SCNC: 3.9 MMOL/L (ref 3.5–5.3)
PROT SERPL-MCNC: 7.1 G/DL (ref 6.4–8.2)
PSA SERPL-MCNC: 5.45 NG/ML
RBC # BLD AUTO: 4.82 X10*6/UL (ref 4.5–5.9)
SODIUM SERPL-SCNC: 140 MMOL/L (ref 136–145)
TRIGL SERPL-MCNC: 113 MG/DL (ref 0–149)
VLDL: 23 MG/DL (ref 0–40)
WBC # BLD AUTO: 7.3 X10*3/UL (ref 4.4–11.3)

## 2024-06-13 PROCEDURE — 36415 COLL VENOUS BLD VENIPUNCTURE: CPT

## 2024-06-13 PROCEDURE — 80053 COMPREHEN METABOLIC PANEL: CPT

## 2024-06-13 PROCEDURE — 80061 LIPID PANEL: CPT

## 2024-06-13 PROCEDURE — G0103 PSA SCREENING: HCPCS

## 2024-06-13 PROCEDURE — 85027 COMPLETE CBC AUTOMATED: CPT

## 2024-06-17 ENCOUNTER — APPOINTMENT (OUTPATIENT)
Dept: PRIMARY CARE | Facility: CLINIC | Age: 74
End: 2024-06-17
Payer: MEDICARE

## 2024-06-17 VITALS
SYSTOLIC BLOOD PRESSURE: 128 MMHG | HEART RATE: 84 BPM | BODY MASS INDEX: 27.42 KG/M2 | OXYGEN SATURATION: 97 % | DIASTOLIC BLOOD PRESSURE: 80 MMHG | WEIGHT: 185.1 LBS | HEIGHT: 69 IN

## 2024-06-17 DIAGNOSIS — I73.9 PAD (PERIPHERAL ARTERY DISEASE) (CMS-HCC): ICD-10-CM

## 2024-06-17 DIAGNOSIS — G89.29 CHRONIC LEFT-SIDED LOW BACK PAIN WITH LEFT-SIDED SCIATICA: Primary | ICD-10-CM

## 2024-06-17 DIAGNOSIS — C67.9 MALIGNANT NEOPLASM OF URINARY BLADDER, UNSPECIFIED SITE (MULTI): ICD-10-CM

## 2024-06-17 DIAGNOSIS — E78.2 MIXED HYPERLIPIDEMIA: ICD-10-CM

## 2024-06-17 DIAGNOSIS — M54.42 CHRONIC LEFT-SIDED LOW BACK PAIN WITH LEFT-SIDED SCIATICA: Primary | ICD-10-CM

## 2024-06-17 DIAGNOSIS — Z98.1 HISTORY OF LUMBAR FUSION: ICD-10-CM

## 2024-06-17 DIAGNOSIS — Z12.5 SCREENING FOR PROSTATE CANCER: ICD-10-CM

## 2024-06-17 DIAGNOSIS — I10 HYPERTENSION, UNSPECIFIED TYPE: ICD-10-CM

## 2024-06-17 PROCEDURE — 1159F MED LIST DOCD IN RCRD: CPT

## 2024-06-17 PROCEDURE — G0439 PPPS, SUBSEQ VISIT: HCPCS

## 2024-06-17 PROCEDURE — 3074F SYST BP LT 130 MM HG: CPT

## 2024-06-17 PROCEDURE — 3079F DIAST BP 80-89 MM HG: CPT

## 2024-06-17 PROCEDURE — 99214 OFFICE O/P EST MOD 30 MIN: CPT

## 2024-06-17 PROCEDURE — 1124F ACP DISCUSS-NO DSCNMKR DOCD: CPT

## 2024-06-17 PROCEDURE — 1170F FXNL STATUS ASSESSED: CPT

## 2024-06-17 PROCEDURE — 1036F TOBACCO NON-USER: CPT

## 2024-06-17 PROCEDURE — 1157F ADVNC CARE PLAN IN RCRD: CPT

## 2024-06-17 RX ORDER — LISINOPRIL 30 MG/1
30 TABLET ORAL DAILY
Qty: 90 TABLET | Refills: 3 | Status: SHIPPED | OUTPATIENT
Start: 2024-06-17 | End: 2025-06-17

## 2024-06-17 RX ORDER — PREDNISONE 50 MG/1
50 TABLET ORAL DAILY
Qty: 5 TABLET | Refills: 0 | Status: SHIPPED | OUTPATIENT
Start: 2024-06-17 | End: 2024-06-22

## 2024-06-17 RX ORDER — MELOXICAM 15 MG/1
15 TABLET ORAL DAILY
Qty: 30 TABLET | Refills: 0 | Status: SHIPPED | OUTPATIENT
Start: 2024-06-17 | End: 2024-07-17

## 2024-06-17 RX ORDER — CYCLOBENZAPRINE HCL 10 MG
10 TABLET ORAL 3 TIMES DAILY PRN
Qty: 45 TABLET | Refills: 0 | Status: SHIPPED | OUTPATIENT
Start: 2024-06-17 | End: 2024-07-02

## 2024-06-17 RX ORDER — DICLOFENAC SODIUM 10 MG/G
4 GEL TOPICAL 4 TIMES DAILY
Qty: 100 G | Refills: 1 | Status: SHIPPED | OUTPATIENT
Start: 2024-06-17

## 2024-06-17 ASSESSMENT — ACTIVITIES OF DAILY LIVING (ADL)
GROCERY_SHOPPING: INDEPENDENT
MANAGING_FINANCES: INDEPENDENT
DOING_HOUSEWORK: INDEPENDENT
DRESSING: INDEPENDENT
TAKING_MEDICATION: INDEPENDENT
BATHING: INDEPENDENT

## 2024-06-17 ASSESSMENT — PATIENT HEALTH QUESTIONNAIRE - PHQ9
SUM OF ALL RESPONSES TO PHQ9 QUESTIONS 1 AND 2: 0
1. LITTLE INTEREST OR PLEASURE IN DOING THINGS: NOT AT ALL
2. FEELING DOWN, DEPRESSED OR HOPELESS: NOT AT ALL

## 2024-06-17 NOTE — PROGRESS NOTES
"Subjective   Patient ID: Dariusz Panchal is a 73 y.o. male who presents for Medicare Annual Wellness Visit Subsequent (Medicare welness - complaints of backache x 2 months.).    HPI   HTN: BP good in office today 128/80. Endorses <130/80. Compliant with medication, no SE. Denies HA/CP/dizziness. Home BP is usually <130/80. Watches his salt intake.  HYPERLIPIDEMIA: Lipids WNL last check. Compliant with statin, no SE.   LUMBAR RADICULOPATHY: S/P L4-5 MIS TLIF January 2023 per Dr. De Jesus. Currently doing HEP. Original issues are stable. New L side lumbar pain which will radiate into L groin. Started about 6 months ago and has progressively worsened, most days, otc tylenol/NSAID not helpful.   PAD: History of PAD, denies claudication recently. Has not seen vascular in a couple years. Does not want referral at this time.  BLADDER CANCER: Following with Dr. Sotelo, receiving tx currently, doing okay.     Colonoscopy 2017    Review of Systems   Constitutional: Negative.    Respiratory: Negative.     Cardiovascular: Negative.    Gastrointestinal: Negative.        Objective   /80 (BP Location: Left arm, Patient Position: Sitting)   Pulse 84   Ht 1.753 m (5' 9\")   Wt 84 kg (185 lb 1.6 oz)   SpO2 97%   BMI 27.33 kg/m²     Physical Exam  Constitutional:       General: He is not in acute distress.     Appearance: Normal appearance. He is not ill-appearing.   HENT:      Head: Normocephalic and atraumatic.   Eyes:      Extraocular Movements: Extraocular movements intact.      Conjunctiva/sclera: Conjunctivae normal.   Cardiovascular:      Rate and Rhythm: Normal rate and regular rhythm.      Heart sounds: Normal heart sounds. No murmur heard.  Pulmonary:      Effort: Pulmonary effort is normal.      Breath sounds: Normal breath sounds. No wheezing.   Abdominal:      General: There is no distension.   Musculoskeletal:         General: Normal range of motion.      Cervical back: Normal range of motion.   Skin:     General: Skin " is warm and dry.   Neurological:      General: No focal deficit present.      Mental Status: He is alert and oriented to person, place, and time.   Psychiatric:         Mood and Affect: Mood normal.         Behavior: Behavior normal.         Thought Content: Thought content normal.         Judgment: Judgment normal.         Assessment/Plan        Labs reviewed and stable with slightly elevated PSA, following with Dr. Sotelo.  Rx prednisone/meloxicam/flexeril back pain, advised let me know if not resolving and may Xray/pain mgmt/follow up with Dr. De Jesus.   Follow up 1 year with fasting labs prior.

## 2024-07-01 NOTE — PROGRESS NOTES
Subjective   Patient ID: Dariusz Panchal is a 73 y.o. male.    HPI Patient is here for a follow up with CT results. Patient has hx of bladder cancer, s/p BCG x 9. Cysto normal 5/24.. CT did not show any evidence of metastatic disease. PSA was 5.45 6/24. Prior PSA was 3.20 (7/10/2023) 2.40 (6/23/2021) BPH/LUTS are stable and mild. Nocturia x 1, depending on fluid intake. No dysuria or hematuria. No UTI sx, ED is chronic No medication for this.     Review of Systems   Constitutional:  Negative for chills and fever.   HENT: Negative.     Eyes: Negative.    Respiratory:  Negative for cough and shortness of breath.    Cardiovascular:  Negative for chest pain and leg swelling.   Gastrointestinal:  Negative for nausea.   Endocrine: Negative.    Genitourinary:  Negative for difficulty urinating.        Negative except for documented in HPI   Allergic/Immunologic: Negative.    Neurological:         Alert & oriented X 3   Hematological:         Denies blood thinners   Psychiatric/Behavioral: Negative.         Objective   Physical Exam  Vitals and nursing note reviewed.   Constitutional:       General: He is not in acute distress.     Appearance: Normal appearance.   Pulmonary:      Effort: Pulmonary effort is normal.   Abdominal:      Tenderness: There is no abdominal tenderness.   Genitourinary:     Comments: Kidneys non palpable bilaterally  Bladder non palpable or tender  Scrotum no mass, No hydrocele  Epididymis- No spermatocele. Non Tender.  Testicles: No mass. WNL  Urethra: No discharge  Penis within normal limits... No lesions. No phimosis  Prostate - symmetric, no nodules. BENIGN  Seminal Vesicals: No mass.  Sphincter tone: normal  Neurological:      Mental Status: He is alert.         Assessment/Plan   Diagnoses and all orders for this visit:  Malignant neoplasm of urinary bladder, unspecified site (Multi)  Erectile dysfunction, unspecified erectile dysfunction type  Nocturia    All available PSA values reviewed, Options  discussed. Questions answered.   Diet changes for prostate health discussed and educational information given. Pros/Cons of prostate health supplements discussed.   Treatment options for LUTS reviewed  Discussed timed voiding. Discussed fluid and caffeine intake  Treatment options for ED reviewed.  Lifestyle change to help prevent UTIs discussed. Encouraged fluid intake.    F/U   MRI prostate ordered  Pros and cons of prostate biopsy reviewed. Other options discussed. Questions answered  CT urogram reviewed-No METS or recurrence  Cysto notes reviewed  Plan BCG x 3 when available.

## 2024-07-10 ENCOUNTER — APPOINTMENT (OUTPATIENT)
Dept: UROLOGY | Facility: CLINIC | Age: 74
End: 2024-07-10
Payer: MEDICARE

## 2024-07-10 VITALS
WEIGHT: 186 LBS | SYSTOLIC BLOOD PRESSURE: 122 MMHG | RESPIRATION RATE: 16 BRPM | DIASTOLIC BLOOD PRESSURE: 84 MMHG | BODY MASS INDEX: 27.47 KG/M2

## 2024-07-10 DIAGNOSIS — R97.20 ELEVATED PSA: ICD-10-CM

## 2024-07-10 DIAGNOSIS — N52.9 ERECTILE DYSFUNCTION, UNSPECIFIED ERECTILE DYSFUNCTION TYPE: ICD-10-CM

## 2024-07-10 DIAGNOSIS — R35.1 NOCTURIA: ICD-10-CM

## 2024-07-10 DIAGNOSIS — C67.9 MALIGNANT NEOPLASM OF URINARY BLADDER, UNSPECIFIED SITE (MULTI): ICD-10-CM

## 2024-07-10 PROCEDURE — 3074F SYST BP LT 130 MM HG: CPT | Performed by: UROLOGY

## 2024-07-10 PROCEDURE — 1036F TOBACCO NON-USER: CPT | Performed by: UROLOGY

## 2024-07-10 PROCEDURE — 3079F DIAST BP 80-89 MM HG: CPT | Performed by: UROLOGY

## 2024-07-10 PROCEDURE — 1157F ADVNC CARE PLAN IN RCRD: CPT | Performed by: UROLOGY

## 2024-07-10 PROCEDURE — 99214 OFFICE O/P EST MOD 30 MIN: CPT | Performed by: UROLOGY

## 2024-07-10 PROCEDURE — 1159F MED LIST DOCD IN RCRD: CPT | Performed by: UROLOGY

## 2024-07-10 ASSESSMENT — ENCOUNTER SYMPTOMS
DIFFICULTY URINATING: 0
PSYCHIATRIC NEGATIVE: 1
ALLERGIC/IMMUNOLOGIC NEGATIVE: 1
NAUSEA: 0
FEVER: 0
CHILLS: 0
ENDOCRINE NEGATIVE: 1
EYES NEGATIVE: 1
COUGH: 0
SHORTNESS OF BREATH: 0

## 2024-07-16 ENCOUNTER — HOSPITAL ENCOUNTER (OUTPATIENT)
Dept: RADIOLOGY | Facility: CLINIC | Age: 74
Discharge: HOME | End: 2024-07-16
Payer: MEDICARE

## 2024-07-16 ENCOUNTER — APPOINTMENT (OUTPATIENT)
Dept: PRIMARY CARE | Facility: CLINIC | Age: 74
End: 2024-07-16
Payer: MEDICARE

## 2024-07-16 VITALS
OXYGEN SATURATION: 96 % | HEIGHT: 69 IN | BODY MASS INDEX: 27.11 KG/M2 | SYSTOLIC BLOOD PRESSURE: 132 MMHG | HEART RATE: 90 BPM | DIASTOLIC BLOOD PRESSURE: 80 MMHG | WEIGHT: 183 LBS

## 2024-07-16 DIAGNOSIS — M25.552 PAIN OF LEFT HIP: Primary | ICD-10-CM

## 2024-07-16 DIAGNOSIS — M54.42 CHRONIC LEFT-SIDED LOW BACK PAIN WITH LEFT-SIDED SCIATICA: ICD-10-CM

## 2024-07-16 DIAGNOSIS — G89.29 CHRONIC LEFT-SIDED LOW BACK PAIN WITH LEFT-SIDED SCIATICA: ICD-10-CM

## 2024-07-16 DIAGNOSIS — M25.552 PAIN OF LEFT HIP: ICD-10-CM

## 2024-07-16 PROCEDURE — 72100 X-RAY EXAM L-S SPINE 2/3 VWS: CPT | Performed by: RADIOLOGY

## 2024-07-16 PROCEDURE — 1036F TOBACCO NON-USER: CPT

## 2024-07-16 PROCEDURE — 73502 X-RAY EXAM HIP UNI 2-3 VIEWS: CPT | Mod: LEFT SIDE | Performed by: RADIOLOGY

## 2024-07-16 PROCEDURE — 1157F ADVNC CARE PLAN IN RCRD: CPT

## 2024-07-16 PROCEDURE — 72100 X-RAY EXAM L-S SPINE 2/3 VWS: CPT

## 2024-07-16 PROCEDURE — 3075F SYST BP GE 130 - 139MM HG: CPT

## 2024-07-16 PROCEDURE — 73502 X-RAY EXAM HIP UNI 2-3 VIEWS: CPT | Mod: LT

## 2024-07-16 PROCEDURE — 3079F DIAST BP 80-89 MM HG: CPT

## 2024-07-16 PROCEDURE — 99213 OFFICE O/P EST LOW 20 MIN: CPT

## 2024-07-16 PROCEDURE — 1159F MED LIST DOCD IN RCRD: CPT

## 2024-07-16 RX ORDER — GABAPENTIN 100 MG/1
100 CAPSULE ORAL 3 TIMES DAILY
Qty: 90 CAPSULE | Refills: 0 | Status: SHIPPED | OUTPATIENT
Start: 2024-07-16 | End: 2024-08-15

## 2024-07-16 RX ORDER — CELECOXIB 200 MG/1
200 CAPSULE ORAL 2 TIMES DAILY
Qty: 60 CAPSULE | Refills: 5 | Status: SHIPPED | OUTPATIENT
Start: 2024-07-16 | End: 2025-01-12

## 2024-07-16 ASSESSMENT — ENCOUNTER SYMPTOMS
CONSTITUTIONAL NEGATIVE: 1
GASTROINTESTINAL NEGATIVE: 1
CARDIOVASCULAR NEGATIVE: 1
RESPIRATORY NEGATIVE: 1

## 2024-07-16 NOTE — PROGRESS NOTES
"Subjective   Patient ID: Dariusz Panchal is a 73 y.o. male who presents for pt c/o left hip pain, radiates down front of thigh .    HPI   LUMBAR RADICULOPATHY: S/P L4-5 MIS TLIF January 2023 per Dr. De Jesus. Currently doing HEP. Original issues are stable. New L side lumbar pain which will radiate into L groin. Started about 6 months ago and has progressively worsened, most days, otc tylenol/NSAID not helpful. 1 month ago trial of meloxicam which helped mildly, prednisone, Volteran gel, Flexeril, but still with daily pain to L hip which will radiate to L low back and down L leg.     Review of Systems   Constitutional: Negative.    Respiratory: Negative.     Cardiovascular: Negative.    Gastrointestinal: Negative.        Objective   /80   Pulse 90   Ht 1.753 m (5' 9\")   Wt 83 kg (183 lb)   SpO2 96%   BMI 27.02 kg/m²     Physical Exam  Constitutional:       General: He is not in acute distress.     Appearance: Normal appearance. He is not ill-appearing.   HENT:      Head: Normocephalic and atraumatic.   Eyes:      Extraocular Movements: Extraocular movements intact.      Conjunctiva/sclera: Conjunctivae normal.   Cardiovascular:      Rate and Rhythm: Normal rate.   Pulmonary:      Effort: Pulmonary effort is normal.   Abdominal:      General: There is no distension.   Musculoskeletal:         General: Normal range of motion.      Cervical back: Normal range of motion.   Skin:     General: Skin is warm and dry.   Neurological:      General: No focal deficit present.      Mental Status: He is alert and oriented to person, place, and time.   Psychiatric:         Mood and Affect: Mood normal.         Behavior: Behavior normal.         Thought Content: Thought content normal.         Judgment: Judgment normal.         Assessment/Plan        Stop meloxicam, start Celebrex 200mg BID, Rx gabapentin 100mg TID, continue Flexeril hs and Volteran gel.  He is established with pain mgmt but new referral sent and thank " you.  Xray lumbar/L hip today.  Follow up at med check.

## 2024-07-18 DIAGNOSIS — M25.552 PAIN OF LEFT HIP: ICD-10-CM

## 2024-07-18 DIAGNOSIS — G89.29 CHRONIC LEFT-SIDED LOW BACK PAIN WITH LEFT-SIDED SCIATICA: Primary | ICD-10-CM

## 2024-07-18 DIAGNOSIS — M54.42 CHRONIC LEFT-SIDED LOW BACK PAIN WITH LEFT-SIDED SCIATICA: Primary | ICD-10-CM

## 2024-07-18 RX ORDER — TRAMADOL HYDROCHLORIDE 50 MG/1
50 TABLET ORAL EVERY 4 HOURS PRN
Qty: 36 TABLET | Refills: 0 | Status: SHIPPED | OUTPATIENT
Start: 2024-07-18 | End: 2024-07-24

## 2024-07-18 RX ORDER — PREDNISONE 20 MG/1
TABLET ORAL
Qty: 18 TABLET | Refills: 0 | Status: SHIPPED | OUTPATIENT
Start: 2024-07-18

## 2024-08-05 ENCOUNTER — HOSPITAL ENCOUNTER (OUTPATIENT)
Dept: RADIOLOGY | Facility: HOSPITAL | Age: 74
Discharge: HOME | End: 2024-08-05
Payer: MEDICARE

## 2024-08-05 DIAGNOSIS — R97.20 ELEVATED PSA: ICD-10-CM

## 2024-08-05 PROCEDURE — 2550000001 HC RX 255 CONTRASTS: Performed by: UROLOGY

## 2024-08-05 PROCEDURE — 72197 MRI PELVIS W/O & W/DYE: CPT | Performed by: RADIOLOGY

## 2024-08-05 PROCEDURE — 72197 MRI PELVIS W/O & W/DYE: CPT

## 2024-08-05 PROCEDURE — A9575 INJ GADOTERATE MEGLUMI 0.1ML: HCPCS | Performed by: UROLOGY

## 2024-08-05 RX ORDER — GADOTERATE MEGLUMINE 376.9 MG/ML
16 INJECTION INTRAVENOUS
Status: COMPLETED | OUTPATIENT
Start: 2024-08-05 | End: 2024-08-05

## 2024-08-06 NOTE — PROGRESS NOTES
Patient ID: Dariusz Panchal is a 73 y.o. male.    Procedures  The patient was prepped and draped in the standard fashion.  Lidociane jelly was used on the urethral   Meatus.  A 16 Mongolian red rubber catheter was placed into the bladder.  The bladder was drained.  The bladder was  Then irrigated several times until clear without mucous.  Aprroximately 60ml of a saline and _BCG________________  Solutions was left in the bladder.  The catheter was removed and patient  was asked to hold the solution in the  Bladder as long as possible       BCG 1/3    FOLLOW UP BCG IN 1 WEEK

## 2024-08-07 ENCOUNTER — APPOINTMENT (OUTPATIENT)
Dept: UROLOGY | Facility: CLINIC | Age: 74
End: 2024-08-07
Payer: MEDICARE

## 2024-08-07 DIAGNOSIS — R97.20 ELEVATED PSA: ICD-10-CM

## 2024-08-07 DIAGNOSIS — C67.9 MALIGNANT NEOPLASM OF URINARY BLADDER, UNSPECIFIED SITE (MULTI): ICD-10-CM

## 2024-08-07 DIAGNOSIS — R35.1 NOCTURIA: ICD-10-CM

## 2024-08-07 DIAGNOSIS — N52.9 ERECTILE DYSFUNCTION, UNSPECIFIED ERECTILE DYSFUNCTION TYPE: ICD-10-CM

## 2024-08-07 PROCEDURE — 99213 OFFICE O/P EST LOW 20 MIN: CPT | Performed by: UROLOGY

## 2024-08-07 PROCEDURE — 51720 TREATMENT OF BLADDER LESION: CPT | Performed by: UROLOGY

## 2024-08-07 PROCEDURE — 1157F ADVNC CARE PLAN IN RCRD: CPT | Performed by: UROLOGY

## 2024-08-07 ASSESSMENT — ENCOUNTER SYMPTOMS
DIFFICULTY URINATING: 0
NAUSEA: 0
ALLERGIC/IMMUNOLOGIC NEGATIVE: 1
PSYCHIATRIC NEGATIVE: 1
EYES NEGATIVE: 1
FEVER: 0
ENDOCRINE NEGATIVE: 1
CHILLS: 0
COUGH: 0
SHORTNESS OF BREATH: 0

## 2024-08-07 NOTE — PROGRESS NOTES
Subjective   Patient ID: Dariusz Panchal is a 73 y.o. male.    HPI  Patient is here for a follow up with MRI results. MRI showed PI-RADS 2 lesion. Patient has hx of bladder cancer, s/p BCG. Cysto normal 5/24.. CT did not show any evidence of metastatic disease. PSA was 5.45 6/24. Prior PSA was 3.20 (7/10/2023) 2.40 (6/23/2021) BPH/LUTS are stable and mild. Nocturia x 1, depending on fluid intake. No dysuria or hematuria. No UTI sx, ED is mild. No medication for this.      Review of Systems   Constitutional:  Negative for chills and fever.   HENT: Negative.     Eyes: Negative.    Respiratory:  Negative for cough and shortness of breath.    Cardiovascular:  Negative for chest pain and leg swelling.   Gastrointestinal:  Negative for nausea.   Endocrine: Negative.    Genitourinary:  Negative for difficulty urinating.        Negative except for documented in HPI   Allergic/Immunologic: Negative.    Neurological:         Alert & oriented X 3   Hematological:         Denies blood thinners   Psychiatric/Behavioral: Negative.         Objective   Physical Exam  Vitals and nursing note reviewed.   Constitutional:       General: He is not in acute distress.     Appearance: Normal appearance.   Pulmonary:      Effort: Pulmonary effort is normal.   Abdominal:      Tenderness: There is no abdominal tenderness.   Genitourinary:     Comments: Kidneys non palpable bilaterally  Bladder non palpable or tender  Scrotum no mass, No hydrocele  Epididymis- No spermatocele. Non Tender.  Testicles: No mass  Urethra: No discharge  Penis within normal limits... No lesions  Prostate - deferred  Neurological:      Mental Status: He is alert.         Assessment/Plan   Diagnoses and all orders for this visit:  Malignant neoplasm of urinary bladder, unspecified site (Multi)  -     BCG (live) ([place] BCG) injection 50 mg  Elevated PSA  Nocturia  Erectile dysfunction, unspecified erectile dysfunction type      All available PSA values reviewed, Options  discussed. Questions answered.  MRI reviewed-Rise in PSA likely due to BCG   Diet changes for prostate health discussed and educational information given. Pros/Cons of prostate health supplements discussed.   Treatment options for LUTS reviewed  Discussed timed voiding. Discussed fluid and caffeine intake  Treatment options for ED reviewed.  Lifestyle change to help prevent UTIs discussed. Encouraged fluid intake.    F/U with me for PSA in 11/24 and cysto at that time    AFTER above visit BCG was delivered

## 2024-08-08 ENCOUNTER — OFFICE VISIT (OUTPATIENT)
Dept: PAIN MEDICINE | Facility: CLINIC | Age: 74
End: 2024-08-08
Payer: MEDICARE

## 2024-08-08 VITALS
DIASTOLIC BLOOD PRESSURE: 81 MMHG | BODY MASS INDEX: 27.17 KG/M2 | WEIGHT: 184 LBS | HEART RATE: 109 BPM | SYSTOLIC BLOOD PRESSURE: 137 MMHG

## 2024-08-08 DIAGNOSIS — M54.16 LUMBAR RADICULOPATHY: Primary | ICD-10-CM

## 2024-08-08 DIAGNOSIS — M25.552 PAIN OF LEFT HIP: ICD-10-CM

## 2024-08-08 DIAGNOSIS — G89.29 CHRONIC LEFT-SIDED LOW BACK PAIN WITH LEFT-SIDED SCIATICA: ICD-10-CM

## 2024-08-08 DIAGNOSIS — Z98.1 S/P SPINAL FUSION: ICD-10-CM

## 2024-08-08 DIAGNOSIS — M54.42 CHRONIC LEFT-SIDED LOW BACK PAIN WITH LEFT-SIDED SCIATICA: ICD-10-CM

## 2024-08-08 PROCEDURE — 99214 OFFICE O/P EST MOD 30 MIN: CPT | Performed by: PHYSICIAN ASSISTANT

## 2024-08-08 ASSESSMENT — ENCOUNTER SYMPTOMS
RESPIRATORY NEGATIVE: 1
ENDOCRINE NEGATIVE: 1
CONSTITUTIONAL NEGATIVE: 1
NUMBNESS: 1
CARDIOVASCULAR NEGATIVE: 1
MYALGIAS: 1
WEAKNESS: 1
ALLERGIC/IMMUNOLOGIC NEGATIVE: 1
BACK PAIN: 1
PSYCHIATRIC NEGATIVE: 1
HEMATOLOGIC/LYMPHATIC NEGATIVE: 1
EYES NEGATIVE: 1
ARTHRALGIAS: 1
GASTROINTESTINAL NEGATIVE: 1

## 2024-08-08 ASSESSMENT — PATIENT HEALTH QUESTIONNAIRE - PHQ9
2. FEELING DOWN, DEPRESSED OR HOPELESS: NOT AT ALL
1. LITTLE INTEREST OR PLEASURE IN DOING THINGS: NOT AT ALL
SUM OF ALL RESPONSES TO PHQ9 QUESTIONS 1 AND 2: 0

## 2024-08-08 ASSESSMENT — COLUMBIA-SUICIDE SEVERITY RATING SCALE - C-SSRS
2. HAVE YOU ACTUALLY HAD ANY THOUGHTS OF KILLING YOURSELF?: NO
6. HAVE YOU EVER DONE ANYTHING, STARTED TO DO ANYTHING, OR PREPARED TO DO ANYTHING TO END YOUR LIFE?: NO
1. IN THE PAST MONTH, HAVE YOU WISHED YOU WERE DEAD OR WISHED YOU COULD GO TO SLEEP AND NOT WAKE UP?: NO

## 2024-08-08 NOTE — PROGRESS NOTES
Subjective   Patient ID: Dariusz Panchal is a 73 y.o. male who presents for Pain (PATIENT STATES HE HAS HAD ONGOING LEFT HIP PAIN FOR A WHILE NOW BUT FOR THE PAST 6 MONTHS THE PAIN HAS INCREASED, HE GETS TINGLING INTO THE LEFT THIGH NOT PAIN, HE HAS PAIN WITH STANDING,WALKING, HE GETS RELIEF SITTING IN HIS RECLINER, HE HAS TRIED HOME STRETCHING, TYLENOL NO RELIEF,).VOLATREN GEL DIDN'T GIVE HIM ANY RELIEF , HE STOPPED GABAPENTIN IT DIDN'T GIVE HIM ANY RELIEF, HE IS TAKING CELEBREX BUT IT ISN'T HELPING, PAIN SCORE WITH STANDING/WALING 10/10 PAIN SCORE SITTING 2/10, WILBERT=60%, SOAPP=4    Sarah Taveras CMA 08/08/24 9:27 AM     Patient is a 73-year-old male.  He presents today after a few year hiatus.  Patient ended up having an L4-5 TLIF done in January 2023 by Dr. De Jesus.  He states that he did well after the surgery.  He had been doing a physical therapy exercise program that he learned in the past before the surgery and his surgeon went over the exercises with him and recommended him to continue to do them postoperatively.  He is still doing them to this day.  He was also advised to do a lot of walking.  He has been trying to do this but unfortunately, a few months after the surgery he had this pain that started and it has been progressively getting worse.  He has lower back pain with left radiating leg numbness and tingling as well as intermittent pain that can sometimes go down to his calf that he rates a 2/10 with sitting but a 10/10 with being upright and active.  Walking makes it worse.  He spends a lot of time in his recliner because that is where he is comfortable.  He has used a multitude of medications including Tylenol, NSAIDs, meloxicam, Voltaren, Flexeril, Celebrex, gabapentin and tramadol all without any relief.  He is trying to avoid medications at this time as he feels that they have not been helpful to him.    It should be noted the patient does have a history of bladder cancer.  He was diagnosed last  year.  He has undergone 10 BCG treatments.        Review of Systems   Constitutional: Negative.    HENT: Negative.     Eyes: Negative.    Respiratory: Negative.     Cardiovascular: Negative.    Gastrointestinal: Negative.    Endocrine: Negative.    Genitourinary: Negative.    Musculoskeletal:  Positive for arthralgias, back pain, gait problem and myalgias.   Skin: Negative.    Allergic/Immunologic: Negative.    Neurological:  Positive for weakness and numbness.   Hematological: Negative.    Psychiatric/Behavioral: Negative.         Objective   Physical Exam  Vitals and nursing note reviewed.   Constitutional:       General: He is not in acute distress.     Appearance: Normal appearance. He is not ill-appearing.   HENT:      Head: Normocephalic and atraumatic.      Right Ear: External ear normal.      Left Ear: External ear normal.      Nose: Nose normal.      Mouth/Throat:      Pharynx: Oropharynx is clear.   Eyes:      Conjunctiva/sclera: Conjunctivae normal.   Cardiovascular:      Rate and Rhythm: Normal rate and regular rhythm.      Pulses: Normal pulses.   Pulmonary:      Effort: Pulmonary effort is normal.      Breath sounds: Normal breath sounds.   Musculoskeletal:         General: Normal range of motion.      Cervical back: Normal range of motion.      Comments: 5/5 lower extremity strength  Negative DOMI test  No significant pain with palpation of the facet joints or the sacroiliac joints.   Skin:     General: Skin is warm and dry.   Neurological:      General: No focal deficit present.      Mental Status: He is alert and oriented to person, place, and time. Mental status is at baseline.   Psychiatric:         Mood and Affect: Mood normal.         Behavior: Behavior normal.         Thought Content: Thought content normal.         Judgment: Judgment normal.     XR hip left with pelvis when performed 2 or 3 views  Status: Final result     PACS Images     Show images for XR hip left with pelvis when performed  2 or 3 views  Signed by    Signed Time Phone Pager   Savanah Bourne MD 7/17/2024 20:27 [phone]     Exam Information    Status Exam Begun Exam Ended   Final 7/16/2024 13:28 7/16/2024 13:38     Study Result    Narrative & Impression   Interpreted By:  Savanah Bourne,   STUDY:  Single view pelvis.  Left hip, two views.      INDICATION:  Signs/Symptoms:pain.      COMPARISON:  None.      ACCESSION NUMBER(S):  [id number]      ORDERING CLINICIAN:  VESNA HUGHES      FINDINGS:  No acute fracture or malalignment.  Bilateral hip joint spaces are well preserved.      Soft tissues are within normal limits.      IMPRESSION:  1. Unremarkable pelvis and left hip radiographs.      MACRO:  None.      Signed by: Savanah Bourne 7/17/2024 8:27 PM  Dictation workstation:   [id number]     XR lumbar spine 2-3 views  Status: Final result     PACS Images     Show images for XR lumbar spine 2-3 views  Signed by    Signed Time Phone Pager   Savanah Bourne MD 7/17/2024 20:26 [phone]     Exam Information    Status Exam Begun Exam Ended   Final 7/16/2024 13:28 7/16/2024 13:38     Study Result    Narrative & Impression   Interpreted By:  Savanah Bourne,   STUDY:  Lumbar spine, three views.      INDICATION:  Signs/Symptoms:pain.      COMPARISON:  05/08/2023      ACCESSION NUMBER(S):  [id number]      ORDERING CLINICIAN:  VESNA HUGHES      FINDINGS:  Mild dextroscoliosis centered in the mid lumbar region.  Posterior spinal instrumented and interbody fusion changes at L4-L5.  Hardware is intact without perihardware fractures or lucencies.  Moderate spondylosis and facet arthropathy at L3-4.  Moderate L5-S1 facet arthropathy as well.  Mild L1-2 and L2-3 degenerative changes.  Vertebral body heights are preserved. Posterior elements are intact.      IMPRESSION:  1. Postsurgical changes at L4-5 without hardware complication.  2. Moderate L3-4 and L5-S1 degenerative changes.      MACRO:  None.      Signed by:  Savanah Bourne 7/17/2024 8:26 PM  Dictation workstation:   [id number]         Assessment/Plan   Diagnoses and all orders for this visit:  Lumbar radiculopathy  -     MR lumbar spine wo IV contrast; Future  Pain of left hip  -     Referral to Pain Medicine  Chronic left-sided low back pain with left-sided sciatica  -     Referral to Pain Medicine  S/P spinal fusion  -     MR lumbar spine wo IV contrast; Future       Patient is a 73-year-old male with a past medical history significant for the above-mentioned medical diagnoses.  He had an L4-5 fusion in January 2023.  He did well postoperatively but unfortunate, he has developed symptoms of lower back pain with left buttock pain and left radiating leg numbness and tingling.  This affects his ambulatory status.  This affects his quality of life.  This affects his activities.  Unfortunate, despite a multitude of medications and for continued physical therapy home exercise program taught to him by therapy and then reiterated by his surgeon he has not been able to get the pain under control.  I suspect some adjacent level stenosis and neuritis.  At this time, based on his failure to improve with conservative treatments as well as the significant pain he is experiencing I recommended to patient an updated lumbar MRI for possible other injection options versus surgical consultation depend on the results.  Patient is agreeable.  He will follow-up after the imaging for reevaluation and discussion of options.  OARRS reviewed.

## 2024-08-09 ENCOUNTER — HOSPITAL ENCOUNTER (OUTPATIENT)
Dept: RADIOLOGY | Facility: HOSPITAL | Age: 74
Discharge: HOME | End: 2024-08-09
Payer: MEDICARE

## 2024-08-09 DIAGNOSIS — Z98.1 S/P SPINAL FUSION: ICD-10-CM

## 2024-08-09 DIAGNOSIS — M54.16 LUMBAR RADICULOPATHY: ICD-10-CM

## 2024-08-09 PROCEDURE — 72148 MRI LUMBAR SPINE W/O DYE: CPT

## 2024-08-13 NOTE — PROGRESS NOTES
Patient ID: Dariusz Panchal is a 73 y.o. male.    Procedures  The patient was prepped and draped in the standard fashion.  Lidociane jelly was used on the urethral   Meatus.  A 16 [place] red rubber catheter was placed into the bladder.  The bladder was drained.  The bladder was  Then irrigated several times until clear without mucous.  Aprroximately 60ml of a saline and ___BCG______________  Solutions was left in the bladder.  The catheter was removed and patient  was asked to hold the solution in the  Bladder as long as possible       BCG 2/3 GIVEN    FOLLOW UP BCG IN 1 WEEK.

## 2024-08-14 ENCOUNTER — APPOINTMENT (OUTPATIENT)
Dept: UROLOGY | Facility: CLINIC | Age: 74
End: 2024-08-14
Payer: MEDICARE

## 2024-08-14 DIAGNOSIS — C67.9 MALIGNANT NEOPLASM OF URINARY BLADDER, UNSPECIFIED SITE (MULTI): ICD-10-CM

## 2024-08-14 PROCEDURE — 1157F ADVNC CARE PLAN IN RCRD: CPT | Performed by: UROLOGY

## 2024-08-14 PROCEDURE — 51720 TREATMENT OF BLADDER LESION: CPT | Performed by: UROLOGY

## 2024-08-21 ENCOUNTER — APPOINTMENT (OUTPATIENT)
Dept: UROLOGY | Facility: CLINIC | Age: 74
End: 2024-08-21
Payer: MEDICARE

## 2024-08-21 DIAGNOSIS — C67.9 MALIGNANT NEOPLASM OF URINARY BLADDER, UNSPECIFIED SITE (MULTI): ICD-10-CM

## 2024-08-21 PROCEDURE — 1157F ADVNC CARE PLAN IN RCRD: CPT | Performed by: UROLOGY

## 2024-08-21 PROCEDURE — 51720 TREATMENT OF BLADDER LESION: CPT | Performed by: UROLOGY

## 2024-08-21 NOTE — PROGRESS NOTES
Patient ID: Dariusz Panchal is a 73 y.o. male.    Procedures  The patient was prepped and draped in the standard fashion.  Lidociane jelly was used on the urethral   Meatus.  A 16 [place] red rubber catheter was placed into the bladder.  The bladder was drained.  The bladder was  Then irrigated several times until clear without mucous.  Aprroximately 60ml of a saline and ____BCG_____________  Solutions was left in the bladder.  The catheter was removed and patient  was asked to hold the solution in the  Bladder as long as possible      BCG 3/3 GIVEN.      FOLLOW UP CYSTO IN 3 MONTHS

## 2024-08-22 ENCOUNTER — TELEPHONE (OUTPATIENT)
Dept: PAIN MEDICINE | Facility: CLINIC | Age: 74
End: 2024-08-22

## 2024-08-22 ENCOUNTER — OFFICE VISIT (OUTPATIENT)
Dept: PAIN MEDICINE | Facility: CLINIC | Age: 74
End: 2024-08-22
Payer: MEDICARE

## 2024-08-22 VITALS
WEIGHT: 184 LBS | HEART RATE: 99 BPM | SYSTOLIC BLOOD PRESSURE: 138 MMHG | DIASTOLIC BLOOD PRESSURE: 84 MMHG | HEIGHT: 70 IN | RESPIRATION RATE: 20 BRPM | BODY MASS INDEX: 26.34 KG/M2

## 2024-08-22 DIAGNOSIS — M54.42 CHRONIC LEFT-SIDED LOW BACK PAIN WITH LEFT-SIDED SCIATICA: ICD-10-CM

## 2024-08-22 DIAGNOSIS — G89.29 CHRONIC LEFT-SIDED LOW BACK PAIN WITH LEFT-SIDED SCIATICA: ICD-10-CM

## 2024-08-22 DIAGNOSIS — M48.062 NEUROGENIC CLAUDICATION DUE TO LUMBAR SPINAL STENOSIS: ICD-10-CM

## 2024-08-22 DIAGNOSIS — M54.17 LUMBOSACRAL RADICULITIS: Primary | ICD-10-CM

## 2024-08-22 DIAGNOSIS — Z98.1 HISTORY OF LUMBAR FUSION: ICD-10-CM

## 2024-08-22 PROCEDURE — 99214 OFFICE O/P EST MOD 30 MIN: CPT | Performed by: PHYSICIAN ASSISTANT

## 2024-08-22 RX ORDER — SODIUM CHLORIDE 9 MG/ML
1 INJECTION, SOLUTION INTRAMUSCULAR; INTRAVENOUS; SUBCUTANEOUS ONCE
OUTPATIENT
Start: 2024-08-22 | End: 2024-08-22

## 2024-08-22 RX ORDER — LIDOCAINE HYDROCHLORIDE 20 MG/ML
6 INJECTION, SOLUTION EPIDURAL; INFILTRATION; INTRACAUDAL; PERINEURAL ONCE
OUTPATIENT
Start: 2024-08-22 | End: 2024-08-22

## 2024-08-22 RX ORDER — DEXAMETHASONE SODIUM PHOSPHATE 10 MG/ML
10 INJECTION INTRAMUSCULAR; INTRAVENOUS ONCE
OUTPATIENT
Start: 2024-08-22 | End: 2024-08-22

## 2024-08-22 RX ORDER — LIDOCAINE HYDROCHLORIDE 20 MG/ML
1 INJECTION, SOLUTION EPIDURAL; INFILTRATION; INTRACAUDAL; PERINEURAL ONCE
OUTPATIENT
Start: 2024-08-22 | End: 2024-08-22

## 2024-08-22 RX ORDER — SODIUM CHLORIDE, SODIUM LACTATE, POTASSIUM CHLORIDE, CALCIUM CHLORIDE 600; 310; 30; 20 MG/100ML; MG/100ML; MG/100ML; MG/100ML
20 INJECTION, SOLUTION INTRAVENOUS CONTINUOUS
OUTPATIENT
Start: 2024-08-22

## 2024-08-22 ASSESSMENT — ENCOUNTER SYMPTOMS
BACK PAIN: 1
ALLERGIC/IMMUNOLOGIC NEGATIVE: 1
GASTROINTESTINAL NEGATIVE: 1
HEMATOLOGIC/LYMPHATIC NEGATIVE: 1
CARDIOVASCULAR NEGATIVE: 1
ENDOCRINE NEGATIVE: 1
PSYCHIATRIC NEGATIVE: 1
RESPIRATORY NEGATIVE: 1
ARTHRALGIAS: 1
NUMBNESS: 1
EYES NEGATIVE: 1
WEAKNESS: 1
CONSTITUTIONAL NEGATIVE: 1
MYALGIAS: 1

## 2024-08-22 ASSESSMENT — PAIN SCALES - GENERAL: PAINLEVEL: 3

## 2024-08-22 NOTE — PROGRESS NOTES
Subjective   Patient ID: Dariusz Panchal is a 73 y.o. male who presents for Pain (Patient is here today to follow up on lumbar MRI.  Patient complains of left hip pain.  It radiates down his left to his knee.  He does states he has numbness/tingling.  Patient rates his pain a 3/10 now and a 9/10 at it's worst.  Patient states pain increases with activity.  ).    WILBERT score 49.  Alcohol screen completed, negative.    Kisha Alvarenga RN 08/22/24 1:31 PM     Patient is a 73-year-old male.  He presents today for follow-up after undergoing an updated lumbar MRI.  He continues to have left radiating leg pain that goes down to his knee.  He also has numbness and tingling.  He rates it a 3-9/10.  Patient has a history of  L4-5 TLIF done in January 2023 by Dr. De Jesus.  He did well after the surgery.    He had been doing a physical therapy exercise program that he learned in the past before the surgery and his surgeon went over the exercises with him and recommended him to continue to do them postoperatively.  He is still doing them to this day.      In regards to the pain, walking makes it worse.  He spends a lot of time in his recliner because that is where he is comfortable.  He has used a multitude of medications including Tylenol, NSAIDs, meloxicam, Voltaren, Flexeril, Celebrex, gabapentin and tramadol all without any relief.  Patient does have a history of bladder cancer.  He was diagnosed last year.  He has undergone 10 BCG treatments.        Review of Systems   Constitutional: Negative.    HENT: Negative.     Eyes: Negative.    Respiratory: Negative.     Cardiovascular: Negative.    Gastrointestinal: Negative.    Endocrine: Negative.    Genitourinary: Negative.    Musculoskeletal:  Positive for arthralgias, back pain, gait problem and myalgias.   Skin: Negative.    Allergic/Immunologic: Negative.    Neurological:  Positive for weakness and numbness.   Hematological: Negative.    Psychiatric/Behavioral: Negative.          Objective   Physical Exam  Vitals and nursing note reviewed.   Constitutional:       General: He is not in acute distress.     Appearance: Normal appearance. He is not ill-appearing.   HENT:      Head: Normocephalic and atraumatic.      Right Ear: External ear normal.      Left Ear: External ear normal.      Nose: Nose normal.      Mouth/Throat:      Pharynx: Oropharynx is clear.   Eyes:      Conjunctiva/sclera: Conjunctivae normal.   Cardiovascular:      Rate and Rhythm: Normal rate and regular rhythm.      Pulses: Normal pulses.   Pulmonary:      Effort: Pulmonary effort is normal.      Breath sounds: Normal breath sounds.   Musculoskeletal:         General: Normal range of motion.      Cervical back: Normal range of motion.      Comments: 5/5 lower extremity strength   Skin:     General: Skin is warm and dry.   Neurological:      General: No focal deficit present.      Mental Status: He is alert and oriented to person, place, and time. Mental status is at baseline.   Psychiatric:         Mood and Affect: Mood normal.         Behavior: Behavior normal.         Thought Content: Thought content normal.         Judgment: Judgment normal.         MR lumbar spine wo IV contrast  Status: Final result     PACS Images     Show images for MR lumbar spine wo IV contrast  Signed by    Signed Time Phone Pager   Cecilia Goodson MD 8/10/2024 18:30 [phone]     Exam Information    Status Exam Begun Exam Ended   Final 8/09/2024 11:40 8/09/2024 12:18     Study Result    Narrative & Impression   Interpreted By:  Cecilia Goodson,   STUDY:  MR LUMBAR SPINE WO IV CONTRAST; 8/9/2024 12:18 pm      INDICATION:  Signs/Symptoms:LOWER BACK AND LEFT LEG PAIN AND NUMBNESS.      COMPARISON:  MRI of lumbar spine from 05/06/2022      ACCESSION NUMBER(S):  [id number]      ORDERING CLINICIAN:  PERLA TREJO      TECHNIQUE:  Sagittal and axial T1 and T2 weighted images of the lumbar spine were  acquired. Sagittal STIR imaging  was also performed      FINDINGS:  There is subtle right convex scoliosis of the lumbar spine. The  vertebral bodies demonstrate expected height. There is mild  retrolisthesis of L3 over L4, minimal anterolisthesis of L2 over L3.      As compared to prior study patient has undergone L4-L5 discectomy and  fusion with right-sided laminectomy and facetectomy.      The lower thoracic cord is unremarkable. The conus terminates  appropriately at L1.      At L5-S1, is mild bilateral facet hypertrophy without central canal  stenosis or neural foraminal narrowing.      At L4-L5, patient is status post right-sided laminectomy and  facetectomy. There is no central canal stenosis. Postsurgical soft  tissue changes are seen extending along the right lateral aspect of  thecal sac in the right lateral recess to encase the traversing L5  nerve root. Postsurgical changes are also seen extending into  right-sided neural foramina and contacting the exiting L4 nerve root.  There is mild left neural foraminal narrowing.      At L3-L4, there is posterior osteophytic spurring with a posterior  disc bulge and bilateral facet hypertrophy. There is mild central  canal stenosis with narrowing of bilateral lateral recesses, right  greater than left. There is severe bilateral neural foraminal  narrowing. The overall findings are similar to prior study.      At L2-L3 there is a posterior disc bulge with bilateral facet and  ligamentum flavum hypertrophy. There is a superimposed left  paracentral and foraminal disc protrusion which appears more  pronounced as compared to prior study. There is moderate central  canal stenosis with effacement of left greater than right lateral  recesses and moderate to severe left neural foraminal narrowing. The  overall findings have worsened at this level as compared to prior  study.      At L1-L2, there is posterior disc bulge with bilateral facet  hypertrophy. There is no central canal stenosis or neural  foraminal  narrowing.      At T12-L1, there is no central canal stenosis or neural foraminal  narrowing.      There is patchy edema within posterior paraspinous muscles, right  greater than left.      IMPRESSION:  Status post right-sided facetectomy, laminectomy and interbody fusion  at L4-L5. There are postsurgical soft tissue changes at this level as  detailed.      Multilevel lumbar spondylosis. The findings at L2-L3 have worsened as  compared to prior study.      Signed by: Cecilia Goodson 8/10/2024 6:30 PM  Dictation workstation:   [id number]     Assessment/Plan   Diagnoses and all orders for this visit:  Lumbosacral radiculitis  -     Transforaminal; Future  -     FL pain management; Future  Neurogenic claudication due to lumbar spinal stenosis  History of lumbar fusion  Chronic left-sided low back pain with left-sided sciatica  Other orders  -     NPO Diet Except: Sips with meds; Effective now; Standing  -     Height and weight; Standing  -     Insert and maintain peripheral IV; Standing  -     Saline lock IV; Standing  -     Type And Screen; Standing  -     Inpatient consult to Respiratory Care; Standing  -     lactated Ringer's infusion  -     Adult diet Regular; Standing  -     Vital Signs; Standing  -     Notify physician - Standard Parameters; Standing  -     Continue IV fluids ordered pre-procedure; Standing  -     Prior to Discharge O2 Weaning; Standing  -     Pulse oximetry, continuous; Standing  -     Discharge patient; Standing  -     iohexol (OMNIPaque) 300 mg iodine/mL solution 6 mL  -     lidocaine PF (Xylocaine) 20 mg/mL (2 %) injection 120 mg  -     lidocaine PF (Xylocaine) 20 mg/mL (2 %) injection 20 mg  -     sodium chloride (PF) 0.9% solution 1 mL  -     dexAMETHasone (PF) (Decadron) injection 10 mg       Patient is a 73-year-old male with the above-mentioned medical diagnoses following up today after undergoing a lumbar send he continues lower back pain with left buttock pain and left  radiating leg pain.  This affects his ambulatory status.  This affects his quality of life.  This affects his activities and affects his ability to do the things he wants to do.  At this time, based on his imaging findings, his pain pattern and his failure to improve with previous conservative treatments I recommended to patient pursuing a left sided L2-3 and L3-4 TFESI under fluoroscopy for both diagnostic and therapeutic purposes.  Procedure was discussed.  Risk and benefits were discussed.  Medication hold including Hold ASA for 6 days and vitamins for 7 days was discussed.  Patient will follow-up 2 weeks after the injection for reevaluation.  Call the clinic in the interim should she require anything from our services.

## 2024-08-22 NOTE — H&P (VIEW-ONLY)
Subjective   Patient ID: Dariusz Panchal is a 73 y.o. male who presents for Pain (Patient is here today to follow up on lumbar MRI.  Patient complains of left hip pain.  It radiates down his left to his knee.  He does states he has numbness/tingling.  Patient rates his pain a 3/10 now and a 9/10 at it's worst.  Patient states pain increases with activity.  ).    WILBERT score 49.  Alcohol screen completed, negative.    Kisha Alvarenga RN 08/22/24 1:31 PM     Patient is a 73-year-old male.  He presents today for follow-up after undergoing an updated lumbar MRI.  He continues to have left radiating leg pain that goes down to his knee.  He also has numbness and tingling.  He rates it a 3-9/10.  Patient has a history of  L4-5 TLIF done in January 2023 by Dr. De Jesus.  He did well after the surgery.    He had been doing a physical therapy exercise program that he learned in the past before the surgery and his surgeon went over the exercises with him and recommended him to continue to do them postoperatively.  He is still doing them to this day.      In regards to the pain, walking makes it worse.  He spends a lot of time in his recliner because that is where he is comfortable.  He has used a multitude of medications including Tylenol, NSAIDs, meloxicam, Voltaren, Flexeril, Celebrex, gabapentin and tramadol all without any relief.  Patient does have a history of bladder cancer.  He was diagnosed last year.  He has undergone 10 BCG treatments.        Review of Systems   Constitutional: Negative.    HENT: Negative.     Eyes: Negative.    Respiratory: Negative.     Cardiovascular: Negative.    Gastrointestinal: Negative.    Endocrine: Negative.    Genitourinary: Negative.    Musculoskeletal:  Positive for arthralgias, back pain, gait problem and myalgias.   Skin: Negative.    Allergic/Immunologic: Negative.    Neurological:  Positive for weakness and numbness.   Hematological: Negative.    Psychiatric/Behavioral: Negative.          Objective   Physical Exam  Vitals and nursing note reviewed.   Constitutional:       General: He is not in acute distress.     Appearance: Normal appearance. He is not ill-appearing.   HENT:      Head: Normocephalic and atraumatic.      Right Ear: External ear normal.      Left Ear: External ear normal.      Nose: Nose normal.      Mouth/Throat:      Pharynx: Oropharynx is clear.   Eyes:      Conjunctiva/sclera: Conjunctivae normal.   Cardiovascular:      Rate and Rhythm: Normal rate and regular rhythm.      Pulses: Normal pulses.   Pulmonary:      Effort: Pulmonary effort is normal.      Breath sounds: Normal breath sounds.   Musculoskeletal:         General: Normal range of motion.      Cervical back: Normal range of motion.      Comments: 5/5 lower extremity strength   Skin:     General: Skin is warm and dry.   Neurological:      General: No focal deficit present.      Mental Status: He is alert and oriented to person, place, and time. Mental status is at baseline.   Psychiatric:         Mood and Affect: Mood normal.         Behavior: Behavior normal.         Thought Content: Thought content normal.         Judgment: Judgment normal.         MR lumbar spine wo IV contrast  Status: Final result     PACS Images     Show images for MR lumbar spine wo IV contrast  Signed by    Signed Time Phone Pager   Cecilia Goodson MD 8/10/2024 18:30 [phone]     Exam Information    Status Exam Begun Exam Ended   Final 8/09/2024 11:40 8/09/2024 12:18     Study Result    Narrative & Impression   Interpreted By:  Cecilia Goodson,   STUDY:  MR LUMBAR SPINE WO IV CONTRAST; 8/9/2024 12:18 pm      INDICATION:  Signs/Symptoms:LOWER BACK AND LEFT LEG PAIN AND NUMBNESS.      COMPARISON:  MRI of lumbar spine from 05/06/2022      ACCESSION NUMBER(S):  [id number]      ORDERING CLINICIAN:  PERLA TREJO      TECHNIQUE:  Sagittal and axial T1 and T2 weighted images of the lumbar spine were  acquired. Sagittal STIR imaging  was also performed      FINDINGS:  There is subtle right convex scoliosis of the lumbar spine. The  vertebral bodies demonstrate expected height. There is mild  retrolisthesis of L3 over L4, minimal anterolisthesis of L2 over L3.      As compared to prior study patient has undergone L4-L5 discectomy and  fusion with right-sided laminectomy and facetectomy.      The lower thoracic cord is unremarkable. The conus terminates  appropriately at L1.      At L5-S1, is mild bilateral facet hypertrophy without central canal  stenosis or neural foraminal narrowing.      At L4-L5, patient is status post right-sided laminectomy and  facetectomy. There is no central canal stenosis. Postsurgical soft  tissue changes are seen extending along the right lateral aspect of  thecal sac in the right lateral recess to encase the traversing L5  nerve root. Postsurgical changes are also seen extending into  right-sided neural foramina and contacting the exiting L4 nerve root.  There is mild left neural foraminal narrowing.      At L3-L4, there is posterior osteophytic spurring with a posterior  disc bulge and bilateral facet hypertrophy. There is mild central  canal stenosis with narrowing of bilateral lateral recesses, right  greater than left. There is severe bilateral neural foraminal  narrowing. The overall findings are similar to prior study.      At L2-L3 there is a posterior disc bulge with bilateral facet and  ligamentum flavum hypertrophy. There is a superimposed left  paracentral and foraminal disc protrusion which appears more  pronounced as compared to prior study. There is moderate central  canal stenosis with effacement of left greater than right lateral  recesses and moderate to severe left neural foraminal narrowing. The  overall findings have worsened at this level as compared to prior  study.      At L1-L2, there is posterior disc bulge with bilateral facet  hypertrophy. There is no central canal stenosis or neural  foraminal  narrowing.      At T12-L1, there is no central canal stenosis or neural foraminal  narrowing.      There is patchy edema within posterior paraspinous muscles, right  greater than left.      IMPRESSION:  Status post right-sided facetectomy, laminectomy and interbody fusion  at L4-L5. There are postsurgical soft tissue changes at this level as  detailed.      Multilevel lumbar spondylosis. The findings at L2-L3 have worsened as  compared to prior study.      Signed by: Cecilia Goodson 8/10/2024 6:30 PM  Dictation workstation:   [id number]     Assessment/Plan   Diagnoses and all orders for this visit:  Lumbosacral radiculitis  -     Transforaminal; Future  -     FL pain management; Future  Neurogenic claudication due to lumbar spinal stenosis  History of lumbar fusion  Chronic left-sided low back pain with left-sided sciatica  Other orders  -     NPO Diet Except: Sips with meds; Effective now; Standing  -     Height and weight; Standing  -     Insert and maintain peripheral IV; Standing  -     Saline lock IV; Standing  -     Type And Screen; Standing  -     Inpatient consult to Respiratory Care; Standing  -     lactated Ringer's infusion  -     Adult diet Regular; Standing  -     Vital Signs; Standing  -     Notify physician - Standard Parameters; Standing  -     Continue IV fluids ordered pre-procedure; Standing  -     Prior to Discharge O2 Weaning; Standing  -     Pulse oximetry, continuous; Standing  -     Discharge patient; Standing  -     iohexol (OMNIPaque) 300 mg iodine/mL solution 6 mL  -     lidocaine PF (Xylocaine) 20 mg/mL (2 %) injection 120 mg  -     lidocaine PF (Xylocaine) 20 mg/mL (2 %) injection 20 mg  -     sodium chloride (PF) 0.9% solution 1 mL  -     dexAMETHasone (PF) (Decadron) injection 10 mg       Patient is a 73-year-old male with the above-mentioned medical diagnoses following up today after undergoing a lumbar send he continues lower back pain with left buttock pain and left  radiating leg pain.  This affects his ambulatory status.  This affects his quality of life.  This affects his activities and affects his ability to do the things he wants to do.  At this time, based on his imaging findings, his pain pattern and his failure to improve with previous conservative treatments I recommended to patient pursuing a left sided L2-3 and L3-4 TFESI under fluoroscopy for both diagnostic and therapeutic purposes.  Procedure was discussed.  Risk and benefits were discussed.  Medication hold including Hold ASA for 6 days and vitamins for 7 days was discussed.  Patient will follow-up 2 weeks after the injection for reevaluation.  Call the clinic in the interim should she require anything from our services.

## 2024-09-06 ENCOUNTER — HOSPITAL ENCOUNTER (OUTPATIENT)
Dept: OPERATING ROOM | Facility: HOSPITAL | Age: 74
Setting detail: OUTPATIENT SURGERY
Discharge: HOME | End: 2024-09-06
Payer: MEDICARE

## 2024-09-06 VITALS
HEART RATE: 88 BPM | OXYGEN SATURATION: 99 % | HEIGHT: 70 IN | WEIGHT: 185 LBS | DIASTOLIC BLOOD PRESSURE: 92 MMHG | RESPIRATION RATE: 16 BRPM | TEMPERATURE: 98 F | BODY MASS INDEX: 26.48 KG/M2 | SYSTOLIC BLOOD PRESSURE: 142 MMHG

## 2024-09-06 DIAGNOSIS — M54.17 LUMBOSACRAL RADICULITIS: ICD-10-CM

## 2024-09-06 PROCEDURE — 2500000005 HC RX 250 GENERAL PHARMACY W/O HCPCS: Performed by: PHYSICIAN ASSISTANT

## 2024-09-06 PROCEDURE — 2500000005 HC RX 250 GENERAL PHARMACY W/O HCPCS: Performed by: STUDENT IN AN ORGANIZED HEALTH CARE EDUCATION/TRAINING PROGRAM

## 2024-09-06 PROCEDURE — 2500000004 HC RX 250 GENERAL PHARMACY W/ HCPCS (ALT 636 FOR OP/ED): Performed by: STUDENT IN AN ORGANIZED HEALTH CARE EDUCATION/TRAINING PROGRAM

## 2024-09-06 PROCEDURE — 64483 NJX AA&/STRD TFRM EPI L/S 1: CPT | Mod: LT | Performed by: STUDENT IN AN ORGANIZED HEALTH CARE EDUCATION/TRAINING PROGRAM

## 2024-09-06 RX ORDER — SODIUM CHLORIDE 9 MG/ML
1 INJECTION, SOLUTION INTRAMUSCULAR; INTRAVENOUS; SUBCUTANEOUS ONCE
Status: DISCONTINUED | OUTPATIENT
Start: 2024-09-06 | End: 2024-09-07 | Stop reason: HOSPADM

## 2024-09-06 RX ORDER — DEXAMETHASONE SODIUM PHOSPHATE 10 MG/ML
10 INJECTION INTRAMUSCULAR; INTRAVENOUS ONCE
Status: DISCONTINUED | OUTPATIENT
Start: 2024-09-06 | End: 2024-09-07 | Stop reason: HOSPADM

## 2024-09-06 RX ORDER — LIDOCAINE HYDROCHLORIDE 20 MG/ML
6 INJECTION, SOLUTION EPIDURAL; INFILTRATION; INTRACAUDAL; PERINEURAL ONCE
Status: COMPLETED | OUTPATIENT
Start: 2024-09-06 | End: 2024-09-06

## 2024-09-06 RX ORDER — DEXAMETHASONE SODIUM PHOSPHATE 10 MG/ML
INJECTION INTRAMUSCULAR; INTRAVENOUS AS NEEDED
Status: COMPLETED | OUTPATIENT
Start: 2024-09-06 | End: 2024-09-06

## 2024-09-06 RX ORDER — LIDOCAINE HYDROCHLORIDE 20 MG/ML
1 INJECTION, SOLUTION EPIDURAL; INFILTRATION; INTRACAUDAL; PERINEURAL ONCE
Status: DISCONTINUED | OUTPATIENT
Start: 2024-09-06 | End: 2024-09-07 | Stop reason: HOSPADM

## 2024-09-06 RX ORDER — LIDOCAINE HYDROCHLORIDE 5 MG/ML
INJECTION, SOLUTION INFILTRATION; INTRAVENOUS AS NEEDED
Status: COMPLETED | OUTPATIENT
Start: 2024-09-06 | End: 2024-09-06

## 2024-09-06 ASSESSMENT — COLUMBIA-SUICIDE SEVERITY RATING SCALE - C-SSRS
1. IN THE PAST MONTH, HAVE YOU WISHED YOU WERE DEAD OR WISHED YOU COULD GO TO SLEEP AND NOT WAKE UP?: NO
6. HAVE YOU EVER DONE ANYTHING, STARTED TO DO ANYTHING, OR PREPARED TO DO ANYTHING TO END YOUR LIFE?: NO
2. HAVE YOU ACTUALLY HAD ANY THOUGHTS OF KILLING YOURSELF?: NO

## 2024-09-06 ASSESSMENT — PAIN DESCRIPTION - DESCRIPTORS: DESCRIPTORS: ACHING

## 2024-09-06 ASSESSMENT — ENCOUNTER SYMPTOMS
DEPRESSION: 0
OCCASIONAL FEELINGS OF UNSTEADINESS: 1
LOSS OF SENSATION IN FEET: 0

## 2024-09-06 ASSESSMENT — PATIENT HEALTH QUESTIONNAIRE - PHQ9
2. FEELING DOWN, DEPRESSED OR HOPELESS: NOT AT ALL
SUM OF ALL RESPONSES TO PHQ9 QUESTIONS 1 AND 2: 0
1. LITTLE INTEREST OR PLEASURE IN DOING THINGS: NOT AT ALL

## 2024-09-06 ASSESSMENT — PAIN - FUNCTIONAL ASSESSMENT
PAIN_FUNCTIONAL_ASSESSMENT: 0-10
PAIN_FUNCTIONAL_ASSESSMENT: 0-10

## 2024-09-06 ASSESSMENT — PAIN SCALES - GENERAL
PAINLEVEL_OUTOF10: 2
PAINLEVEL_OUTOF10: 0 - NO PAIN

## 2024-09-06 NOTE — Clinical Note
Pt prepped with chloraprep prior to procedure start. Pt transported to recovery. Wheels locked, siderails up, report given to receiving PACU nurse.

## 2024-09-26 ENCOUNTER — OFFICE VISIT (OUTPATIENT)
Dept: PAIN MEDICINE | Facility: CLINIC | Age: 74
End: 2024-09-26
Payer: MEDICARE

## 2024-09-26 VITALS — RESPIRATION RATE: 14 BRPM | SYSTOLIC BLOOD PRESSURE: 125 MMHG | DIASTOLIC BLOOD PRESSURE: 75 MMHG | HEART RATE: 85 BPM

## 2024-09-26 DIAGNOSIS — Z98.1 HISTORY OF LUMBAR FUSION: ICD-10-CM

## 2024-09-26 DIAGNOSIS — M51.26 PROLAPSED LUMBAR DISC: ICD-10-CM

## 2024-09-26 DIAGNOSIS — M54.17 LUMBOSACRAL RADICULITIS: Primary | ICD-10-CM

## 2024-09-26 DIAGNOSIS — M47.817 SPONDYLOSIS OF LUMBOSACRAL REGION, UNSPECIFIED SPINAL OSTEOARTHRITIS COMPLICATION STATUS: ICD-10-CM

## 2024-09-26 PROCEDURE — 99213 OFFICE O/P EST LOW 20 MIN: CPT | Performed by: PHYSICIAN ASSISTANT

## 2024-09-26 ASSESSMENT — ENCOUNTER SYMPTOMS
ENDOCRINE NEGATIVE: 1
GASTROINTESTINAL NEGATIVE: 1
NUMBNESS: 1
EYES NEGATIVE: 1
HEMATOLOGIC/LYMPHATIC NEGATIVE: 1
PSYCHIATRIC NEGATIVE: 1
CONSTITUTIONAL NEGATIVE: 1
RESPIRATORY NEGATIVE: 1
MYALGIAS: 1
ALLERGIC/IMMUNOLOGIC NEGATIVE: 1
BACK PAIN: 1
ARTHRALGIAS: 1
CARDIOVASCULAR NEGATIVE: 1
WEAKNESS: 1

## 2024-09-26 ASSESSMENT — COLUMBIA-SUICIDE SEVERITY RATING SCALE - C-SSRS
2. HAVE YOU ACTUALLY HAD ANY THOUGHTS OF KILLING YOURSELF?: NO
1. IN THE PAST MONTH, HAVE YOU WISHED YOU WERE DEAD OR WISHED YOU COULD GO TO SLEEP AND NOT WAKE UP?: NO
6. HAVE YOU EVER DONE ANYTHING, STARTED TO DO ANYTHING, OR PREPARED TO DO ANYTHING TO END YOUR LIFE?: NO

## 2024-09-26 NOTE — PROGRESS NOTES
Subjective   Patient ID: Dariusz Panchal is a 74 y.o. male who presents for Follow-up (FUV for Lt L2/3+L3/4 TFESI on 9/6/24 reports great relief with his injection and still working. Today he reports having some pain in his Lower left back 1/10 now and 3/10 at worst with getting up and moving around and bending. He is taking Celebrex daily and uses Voltaren Gel as needed, is he is walking standing still for a few minutes helps his pain. ) WILBERT score 13%.     Colleen Atkins RN 09/26/24 11:02 AM     Patient is a 74-year-old male following up today after undergoing a left-sided L2-3 and L3-4 transforaminal epidural steroid injection.  This was done on 9/6/2024 and he has obtained 95% relief.  He is feeling well and doing well.  He is comfortable and happy.  He has some pain in his back that he rates a 1/10 but he states that overall, things are going well and he is feeling well.  He is comfortable and happy.  He states that things are improved.  He can walk and stand to do the things he wants.  He is using Celebrex and Voltaren gel with improvement.        Review of Systems   Constitutional: Negative.    HENT: Negative.     Eyes: Negative.    Respiratory: Negative.     Cardiovascular: Negative.    Gastrointestinal: Negative.    Endocrine: Negative.    Genitourinary: Negative.    Musculoskeletal:  Positive for arthralgias, back pain and myalgias.   Skin: Negative.    Allergic/Immunologic: Negative.    Neurological:  Positive for weakness and numbness.   Hematological: Negative.    Psychiatric/Behavioral: Negative.         Objective   Physical Exam  Vitals and nursing note reviewed.   Constitutional:       General: He is not in acute distress.     Appearance: Normal appearance. He is not ill-appearing.   HENT:      Head: Normocephalic and atraumatic.      Right Ear: External ear normal.      Left Ear: External ear normal.      Nose: Nose normal.      Mouth/Throat:      Pharynx: Oropharynx is clear.   Eyes:       Conjunctiva/sclera: Conjunctivae normal.   Cardiovascular:      Rate and Rhythm: Normal rate and regular rhythm.      Pulses: Normal pulses.   Pulmonary:      Effort: Pulmonary effort is normal.      Breath sounds: Normal breath sounds.   Musculoskeletal:         General: Normal range of motion.      Cervical back: Normal range of motion.      Comments: 5/5 strength   Skin:     General: Skin is warm and dry.   Neurological:      General: No focal deficit present.      Mental Status: He is alert and oriented to person, place, and time. Mental status is at baseline.   Psychiatric:         Mood and Affect: Mood normal.         Behavior: Behavior normal.         Thought Content: Thought content normal.         Judgment: Judgment normal.       MR lumbar spine wo IV contrast  Status: Final result     PACS Images     Show images for MR lumbar spine wo IV contrast  Signed by    Signed Time Phone Pager   Cecilia Goodson MD 8/10/2024 18:30 [phone]     Exam Information    Status Exam Begun Exam Ended   Final 8/09/2024 11:40 8/09/2024 12:18     Study Result    Narrative & Impression   Interpreted By:  Cecilia Goodson,   STUDY:  MR LUMBAR SPINE WO IV CONTRAST; 8/9/2024 12:18 pm      INDICATION:  Signs/Symptoms:LOWER BACK AND LEFT LEG PAIN AND NUMBNESS.      COMPARISON:  MRI of lumbar spine from 05/06/2022      ACCESSION NUMBER(S):  [id number]      ORDERING CLINICIAN:  PERLA TREJO      TECHNIQUE:  Sagittal and axial T1 and T2 weighted images of the lumbar spine were  acquired. Sagittal STIR imaging was also performed      FINDINGS:  There is subtle right convex scoliosis of the lumbar spine. The  vertebral bodies demonstrate expected height. There is mild  retrolisthesis of L3 over L4, minimal anterolisthesis of L2 over L3.      As compared to prior study patient has undergone L4-L5 discectomy and  fusion with right-sided laminectomy and facetectomy.      The lower thoracic cord is unremarkable. The conus  terminates  appropriately at L1.      At L5-S1, is mild bilateral facet hypertrophy without central canal  stenosis or neural foraminal narrowing.      At L4-L5, patient is status post right-sided laminectomy and  facetectomy. There is no central canal stenosis. Postsurgical soft  tissue changes are seen extending along the right lateral aspect of  thecal sac in the right lateral recess to encase the traversing L5  nerve root. Postsurgical changes are also seen extending into  right-sided neural foramina and contacting the exiting L4 nerve root.  There is mild left neural foraminal narrowing.      At L3-L4, there is posterior osteophytic spurring with a posterior  disc bulge and bilateral facet hypertrophy. There is mild central  canal stenosis with narrowing of bilateral lateral recesses, right  greater than left. There is severe bilateral neural foraminal  narrowing. The overall findings are similar to prior study.      At L2-L3 there is a posterior disc bulge with bilateral facet and  ligamentum flavum hypertrophy. There is a superimposed left  paracentral and foraminal disc protrusion which appears more  pronounced as compared to prior study. There is moderate central  canal stenosis with effacement of left greater than right lateral  recesses and moderate to severe left neural foraminal narrowing. The  overall findings have worsened at this level as compared to prior  study.      At L1-L2, there is posterior disc bulge with bilateral facet  hypertrophy. There is no central canal stenosis or neural foraminal  narrowing.      At T12-L1, there is no central canal stenosis or neural foraminal  narrowing.      There is patchy edema within posterior paraspinous muscles, right  greater than left.      IMPRESSION:  Status post right-sided facetectomy, laminectomy and interbody fusion  at L4-L5. There are postsurgical soft tissue changes at this level as  detailed.      Multilevel lumbar spondylosis. The findings at  L2-L3 have worsened as  compared to prior study.      Signed by: Cecilia Goodson 8/10/2024 6:30 PM  Dictation workstation:   [id number]     Assessment/Plan   Diagnoses and all orders for this visit:  Lumbosacral radiculitis  History of lumbar fusion  Prolapsed lumbar disc  Spondylosis of lumbosacral region, unspecified spinal osteoarthritis complication status       Patient is an 74-year-old male doing well following his recent left-sided L2-3 and L3-4 transforaminal epidural steroid injection.  He is comfortable and happy.  He is pleased with how he is feeling and how he is doing.  At this time, he does not feel that he requires anything from our services.  He wants to follow-up as needed.  We discussed repeating the injection every 3 months as needed.  He will call us should he require anything from our services.

## 2024-10-03 NOTE — PROGRESS NOTES
Patient ID: Dariusz Panchal is a 74 y.o. male.    Procedures  The patient was prepped using a Betadine solution. Lidocaine jelly was instilled into the urethra. The flexible cystoscope was sterilely inserted into the urethra and formal cystoscopy performed in a systematic fashion. . For detailed findings of the procedure, please see Dr. Sotelo remarks below  CIPRO 250MG POBID TIMES 3 DAYS GIVEN    PAPILLARY UROTHELIAL CARCINOMA-HIGH GRADE 8/9/2023-HAD MRI 7/24     PATIENT  HAS HAD 12 BCG TREATMENTS    PSA 5.45 (6/2024)   3.20 (7/10/2023)   2.40 (6/23/2021)    NO RECURRENT MASS. NO STONE. MILD TRABECULATION    PATIENT COMPLETED BCG AND HAD SIGNIFICCANT SIDE EFFECTS WITH LAST 3 TX    F/U 4 MONTHS WITH PSA AND CYSTO AND CT UROGRAM.

## 2024-10-16 ENCOUNTER — APPOINTMENT (OUTPATIENT)
Dept: UROLOGY | Facility: CLINIC | Age: 74
End: 2024-10-16
Payer: MEDICARE

## 2024-10-16 VITALS — HEIGHT: 70 IN | WEIGHT: 183 LBS | BODY MASS INDEX: 26.2 KG/M2

## 2024-10-16 DIAGNOSIS — C67.9 MALIGNANT NEOPLASM OF URINARY BLADDER, UNSPECIFIED SITE (MULTI): Primary | ICD-10-CM

## 2024-10-16 DIAGNOSIS — R97.20 ELEVATED PSA: ICD-10-CM

## 2024-10-16 PROCEDURE — 52000 CYSTOURETHROSCOPY: CPT | Performed by: UROLOGY

## 2024-10-16 RX ORDER — CIPROFLOXACIN 250 MG/1
250 TABLET, FILM COATED ORAL 2 TIMES DAILY
Qty: 6 TABLET | Refills: 0 | Status: SHIPPED | OUTPATIENT
Start: 2024-10-16 | End: 2024-10-19

## 2025-01-07 DIAGNOSIS — M54.42 CHRONIC LEFT-SIDED LOW BACK PAIN WITH LEFT-SIDED SCIATICA: ICD-10-CM

## 2025-01-07 DIAGNOSIS — G89.29 CHRONIC LEFT-SIDED LOW BACK PAIN WITH LEFT-SIDED SCIATICA: ICD-10-CM

## 2025-01-07 DIAGNOSIS — E78.2 MIXED HYPERLIPIDEMIA: ICD-10-CM

## 2025-01-07 DIAGNOSIS — M25.552 PAIN OF LEFT HIP: ICD-10-CM

## 2025-01-07 DIAGNOSIS — I10 HYPERTENSION, UNSPECIFIED TYPE: ICD-10-CM

## 2025-01-07 RX ORDER — ATORVASTATIN CALCIUM 40 MG/1
40 TABLET, FILM COATED ORAL DAILY
Qty: 90 TABLET | Refills: 3 | Status: SHIPPED | OUTPATIENT
Start: 2025-01-07 | End: 2026-01-07

## 2025-01-07 RX ORDER — AMLODIPINE BESYLATE 5 MG/1
5 TABLET ORAL DAILY
Qty: 90 TABLET | Refills: 3 | Status: SHIPPED | OUTPATIENT
Start: 2025-01-07 | End: 2026-01-07

## 2025-01-07 RX ORDER — ATORVASTATIN CALCIUM 40 MG/1
40 TABLET, FILM COATED ORAL DAILY
Qty: 90 TABLET | Refills: 3 | OUTPATIENT
Start: 2025-01-07

## 2025-01-07 RX ORDER — CELECOXIB 200 MG/1
200 CAPSULE ORAL 2 TIMES DAILY
Qty: 60 CAPSULE | Refills: 5 | Status: SHIPPED | OUTPATIENT
Start: 2025-01-07 | End: 2025-07-06

## 2025-01-07 RX ORDER — AMLODIPINE BESYLATE 5 MG/1
5 TABLET ORAL DAILY
Qty: 90 TABLET | Refills: 3 | OUTPATIENT
Start: 2025-01-07

## 2025-01-30 DIAGNOSIS — Z91.041 ALLERGY TO IODINATED CONTRAST: ICD-10-CM

## 2025-01-31 RX ORDER — PREDNISONE 50 MG/1
TABLET ORAL
Qty: 3 TABLET | Refills: 0 | Status: SHIPPED | OUTPATIENT
Start: 2025-01-31

## 2025-02-01 LAB
CREAT SERPL-MCNC: 1.09 MG/DL (ref 0.7–1.28)
EGFRCR SERPLBLD CKD-EPI 2021: 72 ML/MIN/1.73M2
PSA SERPL-MCNC: 2.86 NG/ML

## 2025-02-05 ENCOUNTER — HOSPITAL ENCOUNTER (OUTPATIENT)
Dept: RADIOLOGY | Facility: HOSPITAL | Age: 75
Discharge: HOME | End: 2025-02-05
Payer: MEDICARE

## 2025-02-05 DIAGNOSIS — C67.9 MALIGNANT NEOPLASM OF URINARY BLADDER, UNSPECIFIED SITE (MULTI): ICD-10-CM

## 2025-02-05 PROCEDURE — 74177 CT ABD & PELVIS W/CONTRAST: CPT | Performed by: RADIOLOGY

## 2025-02-05 PROCEDURE — 74178 CT ABD&PLV WO CNTR FLWD CNTR: CPT

## 2025-02-05 PROCEDURE — 2550000001 HC RX 255 CONTRASTS: Performed by: UROLOGY

## 2025-02-05 PROCEDURE — 76376 3D RENDER W/INTRP POSTPROCES: CPT | Performed by: RADIOLOGY

## 2025-02-05 RX ADMIN — IOHEXOL 72 ML: 350 INJECTION, SOLUTION INTRAVENOUS at 10:56

## 2025-02-19 ENCOUNTER — APPOINTMENT (OUTPATIENT)
Dept: UROLOGY | Facility: CLINIC | Age: 75
End: 2025-02-19
Payer: COMMERCIAL

## 2025-02-25 ENCOUNTER — TELEPHONE (OUTPATIENT)
Dept: PRIMARY CARE | Facility: CLINIC | Age: 75
End: 2025-02-25
Payer: MEDICARE

## 2025-02-25 DIAGNOSIS — C67.9 MALIGNANT NEOPLASM OF URINARY BLADDER, UNSPECIFIED SITE (MULTI): Primary | ICD-10-CM

## 2025-03-12 ENCOUNTER — APPOINTMENT (OUTPATIENT)
Dept: UROLOGY | Facility: CLINIC | Age: 75
End: 2025-03-12
Payer: MEDICARE

## 2025-05-01 ENCOUNTER — HOSPITAL ENCOUNTER (OUTPATIENT)
Dept: HOSPITAL 100 - LABSPEC | Age: 75
Discharge: HOME | End: 2025-05-01
Payer: MEDICARE

## 2025-05-01 DIAGNOSIS — C67.2: Primary | ICD-10-CM

## 2025-05-01 LAB — CYTO FLD: (no result)

## 2025-05-01 PROCEDURE — 88313 SPECIAL STAINS GROUP 2: CPT

## 2025-05-01 PROCEDURE — 88108 CYTOPATH CONCENTRATE TECH: CPT

## 2025-06-12 LAB
ALBUMIN SERPL-MCNC: 4.2 G/DL (ref 3.6–5.1)
ALP SERPL-CCNC: 56 U/L (ref 35–144)
ALT SERPL-CCNC: 14 U/L (ref 9–46)
ANION GAP SERPL CALCULATED.4IONS-SCNC: 7 MMOL/L (CALC) (ref 7–17)
AST SERPL-CCNC: 15 U/L (ref 10–35)
BILIRUB SERPL-MCNC: 0.4 MG/DL (ref 0.2–1.2)
BUN SERPL-MCNC: 19 MG/DL (ref 7–25)
CALCIUM SERPL-MCNC: 9.1 MG/DL (ref 8.6–10.3)
CHLORIDE SERPL-SCNC: 108 MMOL/L (ref 98–110)
CHOLEST SERPL-MCNC: 127 MG/DL
CHOLEST/HDLC SERPL: 2.4 (CALC)
CO2 SERPL-SCNC: 26 MMOL/L (ref 20–32)
CREAT SERPL-MCNC: 1.09 MG/DL (ref 0.7–1.28)
EGFRCR SERPLBLD CKD-EPI 2021: 72 ML/MIN/1.73M2
ERYTHROCYTE [DISTWIDTH] IN BLOOD BY AUTOMATED COUNT: 14 % (ref 11–15)
GLUCOSE SERPL-MCNC: 88 MG/DL (ref 65–99)
HCT VFR BLD AUTO: 50.2 % (ref 38.5–50)
HDLC SERPL-MCNC: 53 MG/DL
HGB BLD-MCNC: 15.4 G/DL (ref 13.2–17.1)
LDLC SERPL CALC-MCNC: 55 MG/DL (CALC)
MCH RBC QN AUTO: 31 PG (ref 27–33)
MCHC RBC AUTO-ENTMCNC: 30.7 G/DL (ref 32–36)
MCV RBC AUTO: 101.2 FL (ref 80–100)
NONHDLC SERPL-MCNC: 74 MG/DL (CALC)
PLATELET # BLD AUTO: 193 THOUSAND/UL (ref 140–400)
PMV BLD REES-ECKER: 9.5 FL (ref 7.5–12.5)
POTASSIUM SERPL-SCNC: 4.5 MMOL/L (ref 3.5–5.3)
PROT SERPL-MCNC: 6.5 G/DL (ref 6.1–8.1)
PSA SERPL-MCNC: 3.28 NG/ML
RBC # BLD AUTO: 4.96 MILLION/UL (ref 4.2–5.8)
SODIUM SERPL-SCNC: 141 MMOL/L (ref 135–146)
TRIGL SERPL-MCNC: 104 MG/DL
WBC # BLD AUTO: 5.8 THOUSAND/UL (ref 3.8–10.8)

## 2025-06-13 ENCOUNTER — APPOINTMENT (OUTPATIENT)
Dept: PRIMARY CARE | Facility: CLINIC | Age: 75
End: 2025-06-13
Payer: MEDICARE

## 2025-06-13 VITALS
OXYGEN SATURATION: 95 % | WEIGHT: 183 LBS | SYSTOLIC BLOOD PRESSURE: 110 MMHG | BODY MASS INDEX: 26.2 KG/M2 | HEIGHT: 70 IN | HEART RATE: 74 BPM | DIASTOLIC BLOOD PRESSURE: 78 MMHG

## 2025-06-13 DIAGNOSIS — M54.42 CHRONIC LEFT-SIDED LOW BACK PAIN WITH LEFT-SIDED SCIATICA: ICD-10-CM

## 2025-06-13 DIAGNOSIS — G89.29 CHRONIC LEFT-SIDED LOW BACK PAIN WITH LEFT-SIDED SCIATICA: ICD-10-CM

## 2025-06-13 DIAGNOSIS — M25.552 PAIN OF LEFT HIP: ICD-10-CM

## 2025-06-13 DIAGNOSIS — Z00.00 ROUTINE GENERAL MEDICAL EXAMINATION AT HEALTH CARE FACILITY: Primary | ICD-10-CM

## 2025-06-13 DIAGNOSIS — E78.2 MIXED HYPERLIPIDEMIA: ICD-10-CM

## 2025-06-13 DIAGNOSIS — Z98.1 HISTORY OF LUMBAR FUSION: ICD-10-CM

## 2025-06-13 DIAGNOSIS — Z12.5 SCREENING FOR PROSTATE CANCER: ICD-10-CM

## 2025-06-13 DIAGNOSIS — C67.9 MALIGNANT NEOPLASM OF URINARY BLADDER, UNSPECIFIED SITE (MULTI): ICD-10-CM

## 2025-06-13 DIAGNOSIS — I10 HYPERTENSION, UNSPECIFIED TYPE: ICD-10-CM

## 2025-06-13 PROCEDURE — 99214 OFFICE O/P EST MOD 30 MIN: CPT

## 2025-06-13 PROCEDURE — 1159F MED LIST DOCD IN RCRD: CPT

## 2025-06-13 PROCEDURE — 1157F ADVNC CARE PLAN IN RCRD: CPT

## 2025-06-13 PROCEDURE — 3074F SYST BP LT 130 MM HG: CPT

## 2025-06-13 PROCEDURE — 1170F FXNL STATUS ASSESSED: CPT

## 2025-06-13 PROCEDURE — 3008F BODY MASS INDEX DOCD: CPT

## 2025-06-13 PROCEDURE — G0439 PPPS, SUBSEQ VISIT: HCPCS

## 2025-06-13 PROCEDURE — 3078F DIAST BP <80 MM HG: CPT

## 2025-06-13 PROCEDURE — 1036F TOBACCO NON-USER: CPT

## 2025-06-13 RX ORDER — CELECOXIB 200 MG/1
200 CAPSULE ORAL 2 TIMES DAILY
Qty: 60 CAPSULE | Refills: 5 | Status: CANCELLED | OUTPATIENT
Start: 2025-06-13 | End: 2025-12-10

## 2025-06-13 RX ORDER — LISINOPRIL 30 MG/1
30 TABLET ORAL DAILY
Qty: 90 TABLET | Refills: 3 | Status: CANCELLED | OUTPATIENT
Start: 2025-06-13 | End: 2026-06-13

## 2025-06-13 RX ORDER — TAMSULOSIN HYDROCHLORIDE 0.4 MG/1
0.4 CAPSULE ORAL NIGHTLY
COMMUNITY
Start: 2025-04-14

## 2025-06-13 ASSESSMENT — ACTIVITIES OF DAILY LIVING (ADL)
MANAGING_FINANCES: INDEPENDENT
BATHING: INDEPENDENT
TAKING_MEDICATION: INDEPENDENT
DRESSING: INDEPENDENT
DOING_HOUSEWORK: INDEPENDENT
GROCERY_SHOPPING: INDEPENDENT

## 2025-06-13 ASSESSMENT — ENCOUNTER SYMPTOMS
CONSTITUTIONAL NEGATIVE: 1
RESPIRATORY NEGATIVE: 1
CARDIOVASCULAR NEGATIVE: 1
GASTROINTESTINAL NEGATIVE: 1

## 2025-06-13 NOTE — PROGRESS NOTES
"Subjective   Reason for Visit: Dariusz Panchal is an 74 y.o. male here for a Medicare Wellness visit.     Past Medical, Surgical, and Family History reviewed and updated in chart.    Reviewed all medications by prescribing practitioner or clinical pharmacist (such as prescriptions, OTCs, herbal therapies and supplements) and documented in the medical record.    HPI  HTN: BP good in office today 110/78. Endorses <130/80. Compliant with medication, no SE. Denies HA/CP/dizziness. Home BP is usually <130/80. Watches his salt intake.  HYPERLIPIDEMIA: Lipids WNL last check. Compliant with statin, no SE.   LUMBAR RADICULOPATHY: S/P L4-5 MIS TLIF January 2023 per Dr. De Jesus. Currently doing HEP. Original issues are stable. New L side lumbar pain which will radiate into L groin. Started about 6 months ago and has progressively worsened, most days, otc tylenol/NSAID not helpful. Following with pain mgmt prn.  PAD: History of PAD, denies claudication recently. Has not seen vascular in a couple years. Does not want referral at this time.  BLADDER CANCER: Following with Dr. Ventura.      Colonoscopy 2017, no further colonoscopies.   PSA WNL last check.    Patient Care Team:  Gonzales Bay PA-C as PCP - General  Fab Sotelo MD as Surgeon (Urology)     Review of Systems   Constitutional: Negative.    Respiratory: Negative.     Cardiovascular: Negative.    Gastrointestinal: Negative.        Objective   Vitals:  /78   Pulse 74   Ht 1.778 m (5' 10\")   Wt 83 kg (183 lb)   SpO2 95%   BMI 26.26 kg/m²       Physical Exam  Constitutional:       General: He is not in acute distress.     Appearance: Normal appearance. He is not ill-appearing.   HENT:      Head: Normocephalic and atraumatic.   Eyes:      Extraocular Movements: Extraocular movements intact.      Conjunctiva/sclera: Conjunctivae normal.   Cardiovascular:      Rate and Rhythm: Normal rate.   Pulmonary:      Effort: Pulmonary effort is normal.   Abdominal:      General: " There is no distension.   Musculoskeletal:         General: Normal range of motion.      Cervical back: Normal range of motion.   Skin:     General: Skin is warm and dry.   Neurological:      General: No focal deficit present.      Mental Status: He is alert and oriented to person, place, and time.   Psychiatric:         Mood and Affect: Mood normal.         Behavior: Behavior normal.         Thought Content: Thought content normal.         Judgment: Judgment normal.         Assessment & Plan  Hypertension, unspecified type    Orders:    CBC; Future    Comprehensive Metabolic Panel; Future    Pain of left hip         Chronic left-sided low back pain with left-sided sciatica         Routine general medical examination at health care facility         Mixed hyperlipidemia    Orders:    Lipid Panel; Future    Screening for prostate cancer    Orders:    Prostate Specific Antigen, Screen; Future    Malignant neoplasm of urinary bladder, unspecified site (Multi)         History of lumbar fusion                 Chronic conditions stable.  No medication changes today.  Labs reviewed and stable.  Follow up 1 year with fasting labs prior.

## 2025-06-13 NOTE — PROGRESS NOTES
"Subjective   Reason for Visit: Dariusz Panchal is an 74 y.o. male here for a Medicare Wellness visit.     Past Medical, Surgical, and Family History reviewed and updated in chart.    Reviewed all medications by prescribing practitioner or clinical pharmacist (such as prescriptions, OTCs, herbal therapies and supplements) and documented in the medical record.    HPI    Patient Care Team:  Gonzales Bay PA-C as PCP - General  Fab Sotelo MD as Surgeon (Urology)     Review of Systems    Objective   Vitals:  /78   Pulse 74   Ht 1.778 m (5' 10\")   Wt 83 kg (183 lb)   SpO2 95%   BMI 26.26 kg/m²       Physical Exam    Assessment & Plan  Hypertension, unspecified type         Pain of left hip         Chronic left-sided low back pain with left-sided sciatica         Routine general medical examination at health care facility                   "

## 2025-06-19 ENCOUNTER — APPOINTMENT (OUTPATIENT)
Dept: PRIMARY CARE | Facility: CLINIC | Age: 75
End: 2025-06-19
Payer: MEDICARE

## 2025-07-14 ENCOUNTER — TELEPHONE (OUTPATIENT)
Dept: PRIMARY CARE | Facility: CLINIC | Age: 75
End: 2025-07-14
Payer: MEDICARE

## 2025-07-14 DIAGNOSIS — M54.42 CHRONIC LEFT-SIDED LOW BACK PAIN WITH LEFT-SIDED SCIATICA: ICD-10-CM

## 2025-07-14 DIAGNOSIS — M25.552 PAIN OF LEFT HIP: ICD-10-CM

## 2025-07-14 DIAGNOSIS — G89.29 CHRONIC LEFT-SIDED LOW BACK PAIN WITH LEFT-SIDED SCIATICA: ICD-10-CM

## 2025-07-14 RX ORDER — CELECOXIB 200 MG/1
200 CAPSULE ORAL 2 TIMES DAILY
Qty: 60 CAPSULE | Refills: 5 | OUTPATIENT
Start: 2025-07-14

## 2025-07-22 DIAGNOSIS — G89.29 CHRONIC LEFT-SIDED LOW BACK PAIN WITH LEFT-SIDED SCIATICA: ICD-10-CM

## 2025-07-22 DIAGNOSIS — M54.42 CHRONIC LEFT-SIDED LOW BACK PAIN WITH LEFT-SIDED SCIATICA: ICD-10-CM

## 2025-07-22 DIAGNOSIS — M25.552 PAIN OF LEFT HIP: ICD-10-CM

## 2025-07-22 RX ORDER — CELECOXIB 200 MG/1
200 CAPSULE ORAL 2 TIMES DAILY
Qty: 60 CAPSULE | Refills: 5 | Status: SHIPPED | OUTPATIENT
Start: 2025-07-22 | End: 2026-01-18

## 2026-06-15 ENCOUNTER — APPOINTMENT (OUTPATIENT)
Dept: PRIMARY CARE | Facility: CLINIC | Age: 76
End: 2026-06-15
Payer: MEDICARE